# Patient Record
Sex: MALE | Race: WHITE | URBAN - METROPOLITAN AREA
[De-identification: names, ages, dates, MRNs, and addresses within clinical notes are randomized per-mention and may not be internally consistent; named-entity substitution may affect disease eponyms.]

---

## 2024-03-08 PROBLEM — Z86.79 HISTORY OF HYPERTENSION: Status: ACTIVE | Noted: 2024-03-08

## 2024-03-08 PROBLEM — F31.9 BIPOLAR 1 DISORDER (HCC): Status: ACTIVE | Noted: 2024-03-08

## 2024-03-08 PROBLEM — Z00.00 ROUTINE HEALTH MAINTENANCE: Status: ACTIVE | Noted: 2024-03-08

## 2024-05-08 PROBLEM — F31.9 BIPOLAR 1 DISORDER (HCC): Chronic | Status: ACTIVE | Noted: 2024-03-08

## 2024-05-08 PROBLEM — F41.9 ANXIETY: Status: ACTIVE | Noted: 2024-05-08

## 2024-07-08 PROBLEM — Z71.85 VACCINE COUNSELING: Chronic | Status: ACTIVE | Noted: 2024-07-08

## 2024-07-08 PROBLEM — Z71.85 VACCINE COUNSELING: Status: ACTIVE | Noted: 2024-07-08

## 2024-09-21 ENCOUNTER — APPOINTMENT (OUTPATIENT)
Dept: RADIOLOGY | Facility: MEDICAL CENTER | Age: 64
End: 2024-09-21
Attending: INTERNAL MEDICINE
Payer: MEDICARE

## 2024-09-21 ENCOUNTER — APPOINTMENT (OUTPATIENT)
Dept: RADIOLOGY | Facility: MEDICAL CENTER | Age: 64
End: 2024-09-21
Attending: EMERGENCY MEDICINE
Payer: MEDICARE

## 2024-09-21 ENCOUNTER — HOSPITAL ENCOUNTER (OUTPATIENT)
Facility: MEDICAL CENTER | Age: 64
End: 2024-09-22
Attending: EMERGENCY MEDICINE | Admitting: INTERNAL MEDICINE
Payer: MEDICARE

## 2024-09-21 ENCOUNTER — APPOINTMENT (OUTPATIENT)
Dept: RADIOLOGY | Facility: MEDICAL CENTER | Age: 64
End: 2024-09-21
Payer: MEDICARE

## 2024-09-21 DIAGNOSIS — R47.9 SPEECH DISTURBANCE, UNSPECIFIED TYPE: ICD-10-CM

## 2024-09-21 DIAGNOSIS — R41.82 ALTERED MENTAL STATUS, UNSPECIFIED ALTERED MENTAL STATUS TYPE: ICD-10-CM

## 2024-09-21 PROBLEM — R29.90 STROKE-LIKE SYMPTOMS: Status: ACTIVE | Noted: 2024-09-21

## 2024-09-21 LAB
ABO + RH BLD: NORMAL
ABO GROUP BLD: NORMAL
ALBUMIN SERPL BCP-MCNC: 4.4 G/DL (ref 3.2–4.9)
ALBUMIN/GLOB SERPL: 1.5 G/DL
ALP SERPL-CCNC: 69 U/L (ref 30–99)
ALT SERPL-CCNC: 66 U/L (ref 2–50)
AMPHET UR QL SCN: NEGATIVE
ANION GAP SERPL CALC-SCNC: 12 MMOL/L (ref 7–16)
APTT PPP: 24 SEC (ref 24.7–36)
AST SERPL-CCNC: 107 U/L (ref 12–45)
BARBITURATES UR QL SCN: NEGATIVE
BASOPHILS # BLD AUTO: 0.1 % (ref 0–1.8)
BASOPHILS # BLD: 0.01 K/UL (ref 0–0.12)
BENZODIAZ UR QL SCN: NEGATIVE
BILIRUB SERPL-MCNC: 0.8 MG/DL (ref 0.1–1.5)
BLD GP AB SCN SERPL QL: NORMAL
BUN SERPL-MCNC: 25 MG/DL (ref 8–22)
BZE UR QL SCN: NEGATIVE
CALCIUM ALBUM COR SERPL-MCNC: 8.7 MG/DL (ref 8.5–10.5)
CALCIUM SERPL-MCNC: 9 MG/DL (ref 8.5–10.5)
CANNABINOIDS UR QL SCN: POSITIVE
CHLORIDE SERPL-SCNC: 103 MMOL/L (ref 96–112)
CO2 SERPL-SCNC: 23 MMOL/L (ref 20–33)
CREAT SERPL-MCNC: 0.97 MG/DL (ref 0.5–1.4)
EOSINOPHIL # BLD AUTO: 0 K/UL (ref 0–0.51)
EOSINOPHIL NFR BLD: 0 % (ref 0–6.9)
ERYTHROCYTE [DISTWIDTH] IN BLOOD BY AUTOMATED COUNT: 46.1 FL (ref 35.9–50)
EST. AVERAGE GLUCOSE BLD GHB EST-MCNC: 111 MG/DL
ETHANOL BLD-MCNC: <10.1 MG/DL
FENTANYL UR QL: NEGATIVE
GFR SERPLBLD CREATININE-BSD FMLA CKD-EPI: 88 ML/MIN/1.73 M 2
GLOBULIN SER CALC-MCNC: 2.9 G/DL (ref 1.9–3.5)
GLUCOSE SERPL-MCNC: 114 MG/DL (ref 65–99)
HBA1C MFR BLD: 5.5 % (ref 4–5.6)
HCT VFR BLD AUTO: 43.9 % (ref 42–52)
HGB BLD-MCNC: 15.6 G/DL (ref 14–18)
IMM GRANULOCYTES # BLD AUTO: 0.06 K/UL (ref 0–0.11)
IMM GRANULOCYTES NFR BLD AUTO: 0.5 % (ref 0–0.9)
INR PPP: 0.95 (ref 0.87–1.13)
LYMPHOCYTES # BLD AUTO: 1.11 K/UL (ref 1–4.8)
LYMPHOCYTES NFR BLD: 8.5 % (ref 22–41)
MCH RBC QN AUTO: 32.4 PG (ref 27–33)
MCHC RBC AUTO-ENTMCNC: 35.5 G/DL (ref 32.3–36.5)
MCV RBC AUTO: 91.3 FL (ref 81.4–97.8)
METHADONE UR QL SCN: NEGATIVE
MONOCYTES # BLD AUTO: 0.7 K/UL (ref 0–0.85)
MONOCYTES NFR BLD AUTO: 5.3 % (ref 0–13.4)
NEUTROPHILS # BLD AUTO: 11.21 K/UL (ref 1.82–7.42)
NEUTROPHILS NFR BLD: 85.6 % (ref 44–72)
NRBC # BLD AUTO: 0 K/UL
NRBC BLD-RTO: 0 /100 WBC (ref 0–0.2)
OPIATES UR QL SCN: NEGATIVE
OXYCODONE UR QL SCN: NEGATIVE
PCP UR QL SCN: NEGATIVE
PLATELET # BLD AUTO: 262 K/UL (ref 164–446)
PMV BLD AUTO: 9.7 FL (ref 9–12.9)
POTASSIUM SERPL-SCNC: 4.5 MMOL/L (ref 3.6–5.5)
PROPOXYPH UR QL SCN: NEGATIVE
PROT SERPL-MCNC: 7.3 G/DL (ref 6–8.2)
PROTHROMBIN TIME: 12.6 SEC (ref 12–14.6)
RBC # BLD AUTO: 4.81 M/UL (ref 4.7–6.1)
RH BLD: NORMAL
SODIUM SERPL-SCNC: 138 MMOL/L (ref 135–145)
TROPONIN T SERPL-MCNC: 12 NG/L (ref 6–19)
TSH SERPL-ACNC: 1.39 UIU/ML (ref 0.35–5.5)
WBC # BLD AUTO: 13.1 K/UL (ref 4.8–10.8)

## 2024-09-21 PROCEDURE — 85610 PROTHROMBIN TIME: CPT

## 2024-09-21 PROCEDURE — G0378 HOSPITAL OBSERVATION PER HR: HCPCS

## 2024-09-21 PROCEDURE — 86900 BLOOD TYPING SEROLOGIC ABO: CPT

## 2024-09-21 PROCEDURE — 82077 ASSAY SPEC XCP UR&BREATH IA: CPT

## 2024-09-21 PROCEDURE — 70551 MRI BRAIN STEM W/O DYE: CPT

## 2024-09-21 PROCEDURE — 700111 HCHG RX REV CODE 636 W/ 250 OVERRIDE (IP)

## 2024-09-21 PROCEDURE — 85025 COMPLETE CBC W/AUTO DIFF WBC: CPT

## 2024-09-21 PROCEDURE — 99285 EMERGENCY DEPT VISIT HI MDM: CPT

## 2024-09-21 PROCEDURE — 96375 TX/PRO/DX INJ NEW DRUG ADDON: CPT

## 2024-09-21 PROCEDURE — A9270 NON-COVERED ITEM OR SERVICE: HCPCS

## 2024-09-21 PROCEDURE — 36415 COLL VENOUS BLD VENIPUNCTURE: CPT

## 2024-09-21 PROCEDURE — 700102 HCHG RX REV CODE 250 W/ 637 OVERRIDE(OP): Performed by: EMERGENCY MEDICINE

## 2024-09-21 PROCEDURE — 700102 HCHG RX REV CODE 250 W/ 637 OVERRIDE(OP)

## 2024-09-21 PROCEDURE — 83036 HEMOGLOBIN GLYCOSYLATED A1C: CPT

## 2024-09-21 PROCEDURE — 700117 HCHG RX CONTRAST REV CODE 255: Performed by: EMERGENCY MEDICINE

## 2024-09-21 PROCEDURE — 96374 THER/PROPH/DIAG INJ IV PUSH: CPT

## 2024-09-21 PROCEDURE — 84484 ASSAY OF TROPONIN QUANT: CPT

## 2024-09-21 PROCEDURE — 85730 THROMBOPLASTIN TIME PARTIAL: CPT

## 2024-09-21 PROCEDURE — 70450 CT HEAD/BRAIN W/O DYE: CPT

## 2024-09-21 PROCEDURE — 96372 THER/PROPH/DIAG INJ SC/IM: CPT

## 2024-09-21 PROCEDURE — 99223 1ST HOSP IP/OBS HIGH 75: CPT | Mod: FS

## 2024-09-21 PROCEDURE — 0042T CT-CEREBRAL PERFUSION ANALYSIS: CPT

## 2024-09-21 PROCEDURE — 80307 DRUG TEST PRSMV CHEM ANLYZR: CPT

## 2024-09-21 PROCEDURE — 84443 ASSAY THYROID STIM HORMONE: CPT

## 2024-09-21 PROCEDURE — A9270 NON-COVERED ITEM OR SERVICE: HCPCS | Performed by: EMERGENCY MEDICINE

## 2024-09-21 PROCEDURE — 700111 HCHG RX REV CODE 636 W/ 250 OVERRIDE (IP): Mod: JZ | Performed by: EMERGENCY MEDICINE

## 2024-09-21 PROCEDURE — 86850 RBC ANTIBODY SCREEN: CPT

## 2024-09-21 PROCEDURE — 74018 RADEX ABDOMEN 1 VIEW: CPT

## 2024-09-21 PROCEDURE — 86901 BLOOD TYPING SEROLOGIC RH(D): CPT

## 2024-09-21 PROCEDURE — 80053 COMPREHEN METABOLIC PANEL: CPT

## 2024-09-21 PROCEDURE — 70496 CT ANGIOGRAPHY HEAD: CPT

## 2024-09-21 PROCEDURE — 70498 CT ANGIOGRAPHY NECK: CPT

## 2024-09-21 PROCEDURE — 71045 X-RAY EXAM CHEST 1 VIEW: CPT

## 2024-09-21 RX ORDER — QUETIAPINE FUMARATE 200 MG/1
200 TABLET, FILM COATED ORAL
Status: DISCONTINUED | OUTPATIENT
Start: 2024-09-21 | End: 2024-09-22 | Stop reason: HOSPADM

## 2024-09-21 RX ORDER — ENOXAPARIN SODIUM 100 MG/ML
40 INJECTION SUBCUTANEOUS DAILY
Status: DISCONTINUED | OUTPATIENT
Start: 2024-09-21 | End: 2024-09-22 | Stop reason: HOSPADM

## 2024-09-21 RX ORDER — ASPIRIN 300 MG/1
300 SUPPOSITORY RECTAL DAILY
Status: DISCONTINUED | OUTPATIENT
Start: 2024-09-21 | End: 2024-09-22 | Stop reason: HOSPADM

## 2024-09-21 RX ORDER — ASPIRIN 81 MG/1
162 TABLET ORAL ONCE
Status: COMPLETED | OUTPATIENT
Start: 2024-09-21 | End: 2024-09-21

## 2024-09-21 RX ORDER — PAROXETINE 20 MG/1
20 TABLET, FILM COATED ORAL
Status: DISCONTINUED | OUTPATIENT
Start: 2024-09-21 | End: 2024-09-22 | Stop reason: HOSPADM

## 2024-09-21 RX ORDER — HYDRALAZINE HYDROCHLORIDE 20 MG/ML
10 INJECTION INTRAMUSCULAR; INTRAVENOUS EVERY 4 HOURS PRN
Status: DISCONTINUED | OUTPATIENT
Start: 2024-09-21 | End: 2024-09-22 | Stop reason: HOSPADM

## 2024-09-21 RX ORDER — ASPIRIN 81 MG/1
81 TABLET, CHEWABLE ORAL DAILY
Status: DISCONTINUED | OUTPATIENT
Start: 2024-09-21 | End: 2024-09-22 | Stop reason: HOSPADM

## 2024-09-21 RX ORDER — LORAZEPAM 2 MG/ML
1 INJECTION INTRAMUSCULAR
Status: COMPLETED | OUTPATIENT
Start: 2024-09-21 | End: 2024-09-21

## 2024-09-21 RX ORDER — ONDANSETRON 2 MG/ML
4 INJECTION INTRAMUSCULAR; INTRAVENOUS ONCE
Status: COMPLETED | OUTPATIENT
Start: 2024-09-21 | End: 2024-09-21

## 2024-09-21 RX ORDER — M-VIT,TX,IRON,MINS/CALC/FOLIC 27MG-0.4MG
1 TABLET ORAL DAILY
COMMUNITY

## 2024-09-21 RX ORDER — ACETAMINOPHEN 325 MG/1
650 TABLET ORAL EVERY 6 HOURS PRN
Status: DISCONTINUED | OUTPATIENT
Start: 2024-09-21 | End: 2024-09-22 | Stop reason: HOSPADM

## 2024-09-21 RX ADMIN — IOHEXOL 96 ML: 350 INJECTION, SOLUTION INTRAVENOUS at 14:45

## 2024-09-21 RX ADMIN — ONDANSETRON 4 MG: 2 INJECTION INTRAMUSCULAR; INTRAVENOUS at 15:30

## 2024-09-21 RX ADMIN — IOHEXOL 40 ML: 350 INJECTION, SOLUTION INTRAVENOUS at 14:45

## 2024-09-21 RX ADMIN — ASPIRIN 162 MG: 81 TABLET, COATED ORAL at 16:05

## 2024-09-21 RX ADMIN — ENOXAPARIN SODIUM 40 MG: 100 INJECTION SUBCUTANEOUS at 18:41

## 2024-09-21 RX ADMIN — ASPIRIN 81 MG: 81 TABLET, CHEWABLE ORAL at 18:41

## 2024-09-21 RX ADMIN — PAROXETINE HYDROCHLORIDE 20 MG: 20 TABLET, FILM COATED ORAL at 22:56

## 2024-09-21 RX ADMIN — LORAZEPAM 1 MG: 2 INJECTION INTRAMUSCULAR; INTRAVENOUS at 22:02

## 2024-09-21 RX ADMIN — QUETIAPINE FUMARATE 200 MG: 200 TABLET ORAL at 23:04

## 2024-09-21 SDOH — ECONOMIC STABILITY: TRANSPORTATION INSECURITY
IN THE PAST 12 MONTHS, HAS LACK OF RELIABLE TRANSPORTATION KEPT YOU FROM MEDICAL APPOINTMENTS, MEETINGS, WORK OR FROM GETTING THINGS NEEDED FOR DAILY LIVING?: NO

## 2024-09-21 SDOH — ECONOMIC STABILITY: TRANSPORTATION INSECURITY
IN THE PAST 12 MONTHS, HAS THE LACK OF TRANSPORTATION KEPT YOU FROM MEDICAL APPOINTMENTS OR FROM GETTING MEDICATIONS?: NO

## 2024-09-21 ASSESSMENT — PATIENT HEALTH QUESTIONNAIRE - PHQ9
SUM OF ALL RESPONSES TO PHQ9 QUESTIONS 1 AND 2: 0
1. LITTLE INTEREST OR PLEASURE IN DOING THINGS: NOT AT ALL
2. FEELING DOWN, DEPRESSED, IRRITABLE, OR HOPELESS: NOT AT ALL

## 2024-09-21 ASSESSMENT — LIFESTYLE VARIABLES
HAVE PEOPLE ANNOYED YOU BY CRITICIZING YOUR DRINKING: NO
EVER HAD A DRINK FIRST THING IN THE MORNING TO STEADY YOUR NERVES TO GET RID OF A HANGOVER: NO
ON A TYPICAL DAY WHEN YOU DRINK ALCOHOL HOW MANY DRINKS DO YOU HAVE: 1
TOTAL SCORE: 0
TOTAL SCORE: 0
HOW MANY TIMES IN THE PAST YEAR HAVE YOU HAD 5 OR MORE DRINKS IN A DAY: 0
TOTAL SCORE: 0
AVERAGE NUMBER OF DAYS PER WEEK YOU HAVE A DRINK CONTAINING ALCOHOL: 0
DOES PATIENT WANT TO STOP DRINKING: NO
CONSUMPTION TOTAL: NEGATIVE
HAVE YOU EVER FELT YOU SHOULD CUT DOWN ON YOUR DRINKING: NO
ALCOHOL_USE: NO
EVER FELT BAD OR GUILTY ABOUT YOUR DRINKING: NO

## 2024-09-21 ASSESSMENT — PAIN DESCRIPTION - PAIN TYPE
TYPE: ACUTE PAIN
TYPE: ACUTE PAIN

## 2024-09-21 ASSESSMENT — SOCIAL DETERMINANTS OF HEALTH (SDOH)
IN THE PAST 12 MONTHS, HAS THE ELECTRIC, GAS, OIL, OR WATER COMPANY THREATENED TO SHUT OFF SERVICE IN YOUR HOME?: NO
WITHIN THE LAST YEAR, HAVE YOU BEEN HUMILIATED OR EMOTIONALLY ABUSED IN OTHER WAYS BY YOUR PARTNER OR EX-PARTNER?: NO
WITHIN THE LAST YEAR, HAVE TO BEEN RAPED OR FORCED TO HAVE ANY KIND OF SEXUAL ACTIVITY BY YOUR PARTNER OR EX-PARTNER?: NO
WITHIN THE PAST 12 MONTHS, YOU WORRIED THAT YOUR FOOD WOULD RUN OUT BEFORE YOU GOT THE MONEY TO BUY MORE: NEVER TRUE
WITHIN THE PAST 12 MONTHS, THE FOOD YOU BOUGHT JUST DIDN'T LAST AND YOU DIDN'T HAVE MONEY TO GET MORE: NEVER TRUE
WITHIN THE LAST YEAR, HAVE YOU BEEN AFRAID OF YOUR PARTNER OR EX-PARTNER?: NO
WITHIN THE LAST YEAR, HAVE YOU BEEN KICKED, HIT, SLAPPED, OR OTHERWISE PHYSICALLY HURT BY YOUR PARTNER OR EX-PARTNER?: NO

## 2024-09-21 ASSESSMENT — ENCOUNTER SYMPTOMS
FEVER: 0
HEADACHES: 0
PALPITATIONS: 0
SHORTNESS OF BREATH: 0
SPEECH CHANGE: 1
VOMITING: 0
CHILLS: 0
DIARRHEA: 0
NAUSEA: 0
COUGH: 0
DIZZINESS: 0
ABDOMINAL PAIN: 0
HEARTBURN: 0

## 2024-09-21 ASSESSMENT — FIBROSIS 4 INDEX: FIB4 SCORE: 3.07

## 2024-09-21 NOTE — H&P
Hospital Medicine History & Physical Note    Date of Service  9/21/2024    Primary Care Physician  NO Browne    Consultants  neurology    Specialist Names: Dr Carrillo    Code Status  Full Code    Chief Complaint  Chief Complaint   Patient presents with    ALOC     Pt was found in lobby of his apartment complex confused, with slurred speech. En-route for EMS pt was A &O x 2, GCS 14, with difficulty speaking. On hospital arrival pt became aphasic.        History of Presenting Illness  David Bowling is a 63 y.o. male who presented 9/21/2024 with altered mental status and difficulty speaking for thirty minutes prior to arrival in the lobby of his apartment complex. The apartment  then called EMS. EMS reports that there is no last known well. They noted that the patient was A&O x0 with a GCS of 9. Patient was unsure when the difficulty speaking started. He endorsed a headache. EMS did not note any signs of trauma. They reported that the patient had a change in mentation when pulling into the ED with a GCS of 14 and A&Ox2. However, the patient's mentation status changed again once inside the ED with EMS noting a waxing and waning trend in mentation. EMS reported that his blood sugar was 175 and his blood pressure was 130/88.     In the ED, he is afebrile, vitals are stable and he is on room air. Labs reveal leukocytosis with a left shift and slightly elevated LFT's.  Troponin is negative.  Diagnostic alcohol is negative. Stroke alert was called upon his arrival to ED. Head CT without evidence of hemorrhage, large territory stroke, high grade stenoses, medium or large vessel occlusion. CT perfusion was a normal study. Neurology has low suspicion for a stroke and did not feel he was a candidate for thrombolytics.  Patient will be admitted for stroke work up.    I discussed the plan of care with patient and ERP .    Review of Systems  Review of Systems   Constitutional:  Negative for chills, fever and  malaise/fatigue.   Respiratory:  Negative for cough and shortness of breath.    Cardiovascular:  Negative for chest pain, palpitations and leg swelling.   Gastrointestinal:  Negative for abdominal pain, diarrhea, heartburn, nausea and vomiting.   Genitourinary:  Negative for dysuria, frequency and urgency.   Neurological:  Positive for speech change. Negative for dizziness and headaches.   All other systems reviewed and are negative.      Past Medical History   has a past medical history of Bipolar 1 disorder (HCC) (03/08/2024), Hyperlipidemia (10/19/2015), Lumbar disc herniation (10/19/2015), Melanoma of neck (HCC), Moderate episode of recurrent major depressive disorder (HCC) (10/19/2015), SLAP lesion of shoulder, and Spinal stenosis at L4-L5 level (10/19/2015).    Surgical History   has a past surgical history that includes shoulder surgery (Left); wide excision melanoma, leg, w/poss.stsg; and orif, forearm (Right).     Family History  family history is not on file.   Family history reviewed with patient. There is no family history that is pertinent to the chief complaint.     Social History   reports that he has been smoking cigarettes. He has never used smokeless tobacco. He reports current alcohol use of about 8.4 oz of alcohol per week. He reports current drug use. Drug: Marijuana.    Allergies  No Known Allergies    Medications  Prior to Admission Medications   Prescriptions Last Dose Informant Patient Reported? Taking?   Clonazepam 0.25 MG TABLET DISPERSIBLE 9/21/2024 at 1000 Patient, Patient's Home Pharmacy No Yes   Sig: Take 1 Tablet by mouth 2 times a day as needed (ANXIETY) for up to 90 days.   PARoxetine (PAXIL) 20 MG Tab 9/20/2024 at 2230 Patient, Patient's Home Pharmacy No Yes   Sig: Take 1 Tablet by mouth at bedtime.   quetiapine (SEROQUEL XR) 200 MG XR tablet 9/20/2024 at 2230 Patient, Patient's Home Pharmacy No Yes   Sig: Take 1 Tablet by mouth at bedtime.   therapeutic multivitamin-minerals  (ESTELA-M) Tab 9/21/2024 at 1000 Patient, Patient's Home Pharmacy Yes Yes   Sig: Take 1 Tablet by mouth every day.      Facility-Administered Medications: None       Physical Exam  Temp:  [36.5 °C (97.7 °F)-37.2 °C (98.9 °F)] 37.2 °C (98.9 °F)  Pulse:  [66-85] 67  Resp:  [15-20] 18  BP: (125-149)/() 145/83  SpO2:  [95 %-98 %] 95 %  Blood Pressure: 125/69   Temperature: 36.5 °C (97.7 °F)   Pulse: 75   Respiration: 15   Pulse Oximetry: 95 %       Physical Exam  Vitals and nursing note reviewed.   Constitutional:       Appearance: Normal appearance. He is not ill-appearing.   HENT:      Head: Normocephalic and atraumatic.      Jaw: There is normal jaw occlusion.      Right Ear: Hearing normal.      Left Ear: Hearing normal.      Nose: Nose normal.      Mouth/Throat:      Lips: Pink.      Mouth: Mucous membranes are moist.   Eyes:      Extraocular Movements: Extraocular movements intact.      Conjunctiva/sclera: Conjunctivae normal.      Pupils: Pupils are equal, round, and reactive to light.   Neck:      Vascular: No carotid bruit.   Cardiovascular:      Rate and Rhythm: Normal rate and regular rhythm.      Pulses: Normal pulses.      Heart sounds: Normal heart sounds, S1 normal and S2 normal.   Pulmonary:      Effort: Pulmonary effort is normal.      Breath sounds: Normal breath sounds and air entry. No stridor.   Musculoskeletal:      Cervical back: Normal range of motion and neck supple.      Right lower leg: No edema.      Left lower leg: No edema.   Skin:     General: Skin is warm and dry.      Capillary Refill: Capillary refill takes less than 2 seconds.   Neurological:      General: No focal deficit present.      Mental Status: He is alert and oriented to person, place, and time. Mental status is at baseline.      Sensory: Sensation is intact.      Motor: Motor function is intact.   Psychiatric:         Attention and Perception: Attention and perception normal.         Mood and Affect: Mood and affect  "normal.         Speech: Speech normal.         Behavior: Behavior normal. Behavior is cooperative.         Laboratory:  Recent Labs     09/21/24  1337   WBC 13.1*   RBC 4.81   HEMOGLOBIN 15.6   HEMATOCRIT 43.9   MCV 91.3   MCH 32.4   MCHC 35.5   RDW 46.1   PLATELETCT 262   MPV 9.7     Recent Labs     09/21/24  1447   SODIUM 138   POTASSIUM 4.5   CHLORIDE 103   CO2 23   GLUCOSE 114*   BUN 25*   CREATININE 0.97   CALCIUM 9.0     Recent Labs     09/21/24  1447   ALTSGPT 66*   ASTSGOT 107*   ALKPHOSPHAT 69   TBILIRUBIN 0.8   GLUCOSE 114*     Recent Labs     09/21/24  1337   APTT 24.0*   INR 0.95     No results for input(s): \"NTPROBNP\" in the last 72 hours.      Recent Labs     09/21/24  1447   TROPONINT 12       Imaging:  [id number] VIEW   Final Result      No contraindication to MRI      CT-CTA NECK WITH & W/O-POST PROCESSING   Final Result      1.  No evidence of carotid or vertebral arterial occlusion or dissection.      2.  Mild atherosclerotic plaque involving the carotid bifurcations bilaterally      CT-CTA HEAD WITH & W/O-POST PROCESS   Final Result      1.  CT angiogram of the [place] within normal limits.      2.  Anatomic variant carotid origin of left posterior cerebral artery.      DX-CHEST-PORTABLE (1 VIEW)   Final Result      No acute cardiopulmonary abnormality identified.      CT-CEREBRAL PERFUSION ANALYSIS   Final Result      1. Cerebral blood flow less than 30% possibly representing completed infarct = 0 mL. Based on distribution of this finding, this is unlikely to represent artifact.      2. T Max more than 6 seconds possibly representing combination of completed infarct and ischemia = 0 mL. Based on the distribution of this finding, this is unlikely to represent artifact.      3. Mismatched volume possibly representing ischemic brain/penumbra= 0 mL      4.  Please note that this cerebral perfusion study and report is Quantitative and targets supratentorial (cerebral) perfusion for " "evaluation of large vessel territory acute ischemia/infarction. For example, lacunar infarcts, and brainstem/posterior fossa    ischemia/infarction are not evaluated on this study.  Data acquisition is subject to artifacts which can yield non-anatomically plausible perfusion maps which may be due to motion, bolus timing, signal to noise ratio, or other technical factors.    Perfusion map abnormalities which show non-anatomic distributions are likely artifact.   This study is not \"stand-alone\" and should only be utilized for diagnosis, management/treatment in correlation with CT, CTA, and/or MRI and clinical factors.         CT-HEAD W/O   Final Result      No evidence of acute intracranial process.               MR-BRAIN-W/O    (Results Pending)   EC-ECHOCARDIOGRAM COMPLETE W/O CONT    (Results Pending)       X-Ray:  I have personally reviewed the images and compared with prior images.    Assessment/Plan:  Justification for Admission Status  I anticipate this patient is appropriate for observation status at this time because stroke rule out    Patient will need a Telemetry bed on EMERGENCY service .  The need is secondary to stroke rule out.    * Stroke-like symptoms- (present on admission)  Assessment & Plan  - Patient presents with altered mental status and difficulty speaking  - Code stroke was called in the ER and neurology evaluated  - Neurology felt symptoms were mostly psychosomatic and felt low suspicion for stroke.  However ERP still concerned for stroke and patient is insistent regarding his symptoms.  - CTA head/neck negative  - Telemetry monitoring  - Every 4 neurochecks  - TSH  - Hemoglobin A1c  - ASA  - Lipid panel in a.m  - ECHO (no previous echo on file)  - SLP/PT/OT  - MRI brain  -Reconsult neurology if any new information    History of hypertension- (present on admission)  Assessment & Plan  - Not currently on any antihypertensives  - IV hydralazine available as needed  - Vitals every 4 " hours    Anxiety- (present on admission)  Assessment & Plan  - Continue home Paxil    Bipolar 1 disorder (HCC)- (present on admission)  Assessment & Plan  - Continue home Seroquel    Hyperlipidemia- (present on admission)  Assessment & Plan  - Lipid panel in a.m.    Moderate episode of recurrent major depressive disorder (HCC)- (present on admission)  Assessment & Plan  - Continue home Paxil and Seroquel        VTE prophylaxis: SCDs/TEDs and enoxaparin ppx

## 2024-09-21 NOTE — ED NOTES
Noted having twitching on both arms, then eyes looking straight, sided to side and downwards then started looking at RN then repetitive motion. Awake and alert, asked if he is having some, stared at RN then said yes I'm having pain but did specify. Episode lasted for 10 minutes. Erp made aware. Placed in bed alarm by ED tech.

## 2024-09-21 NOTE — ED PROVIDER NOTES
ER Provider Note    Scribed for Flower King M.d. by Slywia Blackman. 9/21/2024  1:44 PM    Primary Care Provider: NO Browne    CHIEF COMPLAINT   Chief Complaint   Patient presents with    ALOC     Pt was found in lobby of his apartment complex confused, with slurred speech. En-route for EMS pt was A &O x 2, GCS 14, with difficulty speaking. On hospital arrival pt became aphasic.      EXTERNAL RECORDS REVIEWED  Hospital records reviewed showed that the [facility]. He just saw the APRN in July. The patient has a history of bipolar disorder, anxiety and depression.     HPI/ROS  LIMITATION TO HISTORY   Select: Dysarthria  OUTSIDE HISTORIAN(S):  EMS at Charge Desk who provided history as seen below.     David Bowling is a 63 y.o. male who presents to the ED via EMS complaining as a Stroke Assessment. Patient was found to have altered levels of consciousness with difficulty speaking for thirty minutes prior to arrival in the lobby of his apartment complex. The apartment  then called EMS. EMS reports that there is no last known well. They noted that the patient was A&O x0 with a GCS of 9. Patient was unsure when the difficulty speaking started. He endorsed a headache. EMS did not note any signs of trauma. They reported that the patient had a change in mentation when pulling into the ED with a GCS of 14 and A&Ox2. However, the patient's mentation status changed again once inside the ED with EMS noting a waxing and waning trend in mentation. EMS reported that his blood sugar was 175 and his blood pressure was 130/88.     PAST MEDICAL HISTORY  Past Medical History:   Diagnosis Date    Bipolar 1 disorder (HCC) 03/08/2024    Hyperlipidemia 10/19/2015    Lumbar disc herniation 10/19/2015    Melanoma of neck (HCC)     Moderate episode of recurrent major depressive disorder (HCC) 10/19/2015    SLAP lesion of shoulder     right    Spinal stenosis at L4-L5 level 10/19/2015  "      SURGICAL HISTORY  Past Surgical History:   Procedure Laterality Date    ORIF, FOREARM Right     SHOULDER SURGERY Left     WIDE EXCISION MELANOMA, LEG, W/POSS.STSG      neck       FAMILY HISTORY  No family history noted.    SOCIAL HISTORY   reports that he has been smoking cigarettes. He has never used smokeless tobacco. He reports current alcohol use of about 8.4 oz of alcohol per week. He reports current drug use. Drug: Marijuana.    CURRENT MEDICATIONS  Previous Medications    CLONAZEPAM 0.25 MG TABLET DISPERSIBLE    Take 1 Tablet by mouth 2 times a day as needed (ANXIETY) for up to 90 days.    MULTIPLE VITAMINS-MINERALS (CENTRUM ADULTS PO)    Take 1 Tablet by mouth every day at 6 PM.    PAROXETINE (PAXIL) 20 MG TAB    Take 1 Tablet by mouth at bedtime.    QUETIAPINE (SEROQUEL XR) 200 MG XR TABLET    Take 1 Tablet by mouth at bedtime.       ALLERGIES  Patient has no known allergies.    PHYSICAL EXAM  BP (!) 149/103   Pulse 85   Temp 36.5 °C (97.7 °F) (Temporal)   Resp 18   Ht 1.791 m (5' 10.51\")   Wt 82.6 kg (182 lb 1.6 oz)   SpO2 98%   BMI 25.75 kg/m²   Constitutional: Well developed, well nourished; No acute distress; Non-toxic appearance, Appears fearful and confused,   HENT: Normocephalic, atraumatic with no signs of trauma; Bilateral external ears normal;   Eyes: PERRL, EOMI, Conjunctiva normal. No discharge.   Neck:  Supple, nontender midline; No stridor; No nuchal rigidity, No signs of trauma  Lymphatic: No cervical lymphadenopathy noted.   Cardiovascular: Regular rate and rhythm without murmurs, rubs, or gallop.   Thorax & Lungs: No respiratory distress, breath sounds clear to auscultation bilaterally without wheezing, rales or rhonchi. Shallow breathing, Nontender chest wall. No crepitus or subcutaneous air  Abdomen: Soft, nontender, bowel sounds normal. No obvious masses; No pulsatile masses; no rebound, guarding, or peritoneal signs.   Skin: Good color; warm and dry without rash or " petechia.  Back: Nontender, No CVA tenderness.   Extremities: Distal radial, dorsalis pedis, posterior tibial pulses are equal bilaterally; No edema; Nontender calves or saphenous, No cyanosis, No clubbing.   Musculoskeletal: Good range of motion in all major joints. No tenderness to palpation or major deformities noted.   Neurologic: Awake and alert.  No facial droop.  Upon arrival to the ER patient was evaluated at the charge desk.  He states his name.  Speech is slurred.  Suddenly patient stopped speaking.  He looks fearful and anxious.  He looks confused.  He appears to not understand what I am saying.  He would not follow commands.  He will not elevate bilateral arms or legs off the bed on command.  He will not  my fingers.  Unclear if he has normal sensation to light touch.  He will not follow finger with eyes.  EMS, however, reported that and route patient was following commands and did not have any focal deficits on examination.  They reported no focal weakness and normal sensation to light touch.  NIH is 6    DIAGNOSTIC STUDIES    EKG/LABS  Results for orders placed or performed during the hospital encounter of 09/21/24   CBC WITH DIFFERENTIAL   Result Value Ref Range    WBC 13.1 (H) 4.8 - 10.8 K/uL    RBC 4.81 4.70 - 6.10 M/uL    Hemoglobin 15.6 14.0 - 18.0 g/dL    Hematocrit 43.9 42.0 - 52.0 %    MCV 91.3 81.4 - 97.8 fL    MCH 32.4 27.0 - 33.0 pg    MCHC 35.5 32.3 - 36.5 g/dL    RDW 46.1 35.9 - 50.0 fL    Platelet Count 262 164 - 446 K/uL    MPV 9.7 9.0 - 12.9 fL    Neutrophils-Polys 85.60 (H) 44.00 - 72.00 %    Lymphocytes 8.50 (L) 22.00 - 41.00 %    Monocytes 5.30 0.00 - 13.40 %    Eosinophils 0.00 0.00 - 6.90 %    Basophils 0.10 0.00 - 1.80 %    Immature Granulocytes 0.50 0.00 - 0.90 %    Nucleated RBC 0.00 0.00 - 0.20 /100 WBC    Neutrophils (Absolute) 11.21 (H) 1.82 - 7.42 K/uL    Lymphs (Absolute) 1.11 1.00 - 4.80 K/uL    Monos (Absolute) 0.70 0.00 - 0.85 K/uL    Eos (Absolute) 0.00 0.00 - 0.51  K/uL    Baso (Absolute) 0.01 0.00 - 0.12 K/uL    Immature Granulocytes (abs) 0.06 0.00 - 0.11 K/uL    NRBC (Absolute) 0.00 K/uL   PROTHROMBIN TIME   Result Value Ref Range    PT 12.6 12.0 - 14.6 sec    INR 0.95 0.87 - 1.13   APTT   Result Value Ref Range    APTT 24.0 (L) 24.7 - 36.0 sec   URINE DRUG SCREEN   Result Value Ref Range    Amphetamines Urine Negative Negative    Barbiturates Negative Negative    Benzodiazepines Negative Negative    Cocaine Metabolite Negative Negative    Fentanyl, Urine Negative Negative    Methadone Negative Negative    Opiates Negative Negative    Oxycodone Negative Negative    Phencyclidine -Pcp Negative Negative    Propoxyphene Negative Negative    Cannabinoid Metab Positive (A) Negative   Comp Metabolic Panel   Result Value Ref Range    Sodium 138 135 - 145 mmol/L    Potassium 4.5 3.6 - 5.5 mmol/L    Chloride 103 96 - 112 mmol/L    Co2 23 20 - 33 mmol/L    Anion Gap 12.0 7.0 - 16.0    Glucose 114 (H) 65 - 99 mg/dL    Bun 25 (H) 8 - 22 mg/dL    Creatinine 0.97 0.50 - 1.40 mg/dL    Calcium 9.0 8.5 - 10.5 mg/dL    Correct Calcium 8.7 8.5 - 10.5 mg/dL    AST(SGOT) 107 (H) 12 - 45 U/L    ALT(SGPT) 66 (H) 2 - 50 U/L    Alkaline Phosphatase 69 30 - 99 U/L    Total Bilirubin 0.8 0.1 - 1.5 mg/dL    Albumin 4.4 3.2 - 4.9 g/dL    Total Protein 7.3 6.0 - 8.2 g/dL    Globulin 2.9 1.9 - 3.5 g/dL    A-G Ratio 1.5 g/dL   TROPONIN   Result Value Ref Range    Troponin T 12 6 - 19 ng/L   DIAGNOSTIC ALCOHOL   Result Value Ref Range    Diagnostic Alcohol <10.1 <10.1 mg/dL   ESTIMATED GFR   Result Value Ref Range    GFR (CKD-EPI) 88 >60 mL/min/1.73 m 2      I have independently interpreted this EKG    RADIOLOGY/PROCEDURES   The attending emergency physician has independently interpreted the diagnostic imaging associated with this visit and am waiting the final reading from the radiologist.     My preliminary interpretation is a follows: ER MD is reviewed the patient's CT brain and the CT scanner.  No  "obvious head bleed.    Radiologist interpretation:  CT-CTA NECK WITH & W/O-POST PROCESSING   Final Result      1.  No evidence of carotid or vertebral arterial occlusion or dissection.      2.  Mild atherosclerotic plaque involving the carotid bifurcations bilaterally      CT-CTA HEAD WITH & W/O-POST PROCESS   Final Result      1.  CT angiogram of the [place] within normal limits.      2.  Anatomic variant carotid origin of left posterior cerebral artery.      DX-CHEST-PORTABLE (1 VIEW)   Final Result      No acute cardiopulmonary abnormality identified.      CT-CEREBRAL PERFUSION ANALYSIS   Final Result      1. Cerebral blood flow less than 30% possibly representing completed infarct = 0 mL. Based on distribution of this finding, this is unlikely to represent artifact.      2. T Max more than 6 seconds possibly representing combination of completed infarct and ischemia = 0 mL. Based on the distribution of this finding, this is unlikely to represent artifact.      3. Mismatched volume possibly representing ischemic brain/penumbra= 0 mL      4.  Please note that this cerebral perfusion study and report is Quantitative and targets supratentorial (cerebral) perfusion for evaluation of large vessel territory acute ischemia/infarction. For example, lacunar infarcts, and brainstem/posterior fossa    ischemia/infarction are not evaluated on this study.  Data acquisition is subject to artifacts which can yield non-anatomically plausible perfusion maps which may be due to motion, bolus timing, signal to noise ratio, or other technical factors.    Perfusion map abnormalities which show non-anatomic distributions are likely artifact.   This study is not \"stand-alone\" and should only be utilized for diagnosis, management/treatment in correlation with CT, CTA, and/or MRI and clinical factors.         CT-HEAD W/O   Final Result      No evidence of acute intracranial process.               EC-ECHOCARDIOGRAM COMPLETE W/O " CONT    (Results Pending)   MR-BRAIN-W/O    (Results Pending)   [id number] VIEW    (Results Pending)       COURSE & MEDICAL DECISION MAKING     ASSESSMENT, COURSE AND PLAN  Care Narrative: Patient presents to the ER with difficulty speaking.  EMS said onset was unknown however patient was in the office his apartment complex when EMS arrived.  Staff reported the patient was having slurred speech.  EMS reports that patient seemed confused and was unable to speak when they arrived.  Once the patient got in the ambulance he started speaking.  He also seemed much less confused.  Upon arrival here to the charge desk the patient spoke his name.  It was intelligible although it was slurred.  He immediately then stopped talking, became confused, looked fearful and anxious, would not follow commands, and appeared to be aphasic.  He did not seem to understand what I was asking of him in terms of questions or commands.  EMS reports that while patient was in the ambulance and speaking normally and less confused they did do a neurologic examination they did not find any focal weakness.  Patient was immediately taken to the CT scanner.  Given patient's staccato like stroke symptoms, I contacted the neurologist, Dr. Carrillo.  He kindly admit the patient in the CT scanner.  After evaluating the patient he does not feel the patient is a thrombolytic candidate.  CT/CTA of the head and neck were normal.  After the patient got taken off the CT table, he started speaking normally again.  Upon reevaluation here in the ER the patient was speaking normally.  He has history of bipolar disorder and schizophrenia.  He stopped taking his doxepin and Klonopin 2 weeks ago.  He is only on Seroquel right now.  He is tangential in his communication.  He has pressured speech.  He has some flight of ideas.  Patient, however, was very clear that the speech disturbance was very real for him and concerned him quite a bit.  He said that he knew what he  wanted to say but he just could not get the words out.  He distinctly remembers the moment when she was unable to talk.  He tells me that he believes the symptoms started about a half an hour prior to EMS arrival.  At this time patient is feeling much better.  He says he is back to normal.  It is unclear whether this is a supratentorial problem or perhaps patient may have had a TIA.  Given patient's 2 episodes of speech disturbance associated with confusion and the fact the patient is fairly clear that he recalls exactly when he lost his ability to speak and that he knew what he wanted to say but the words would not come out, I think it best that we admit the patient to the hospital for further observation and possible further workup.  I spoke with the hospitalist JOSEFINA on-call and she will kindly evaluate the patient hospitalization.    1:32 PM - Patient seen and examined at Charge Desk as a Stroke Assessment. Discussed plan of care, including a diagnostic work up with imaging to evaluate for a stroke. Patient was unable to consent to the plan of care due to the nature of his condition. Stroke Neurology was paged.     1:46 PM - Dr. Carrillo (Neurology) present at CT scanner. I discussed the patient's case and the above findings with Dr. Carrillo (Neurology) who does not think that the patient is a candidate for thrombolytics.      1:50 PM - The patient is talking normally as he is moved off of the table from the CT scanner.     4:20 PM - Patient was reevaluated at bedside. Discussed lab and radiology results with the patient. The patient is speaking normally. He remembers not being able to talk and was scared and frustrated that he was unable to. Patient remembers that he was riding his bike earlier this morning and wondered if he got too hot. Patient is taking Seroquel. Two weeks ago, he was able Klonopin and doxepin but that he has not been on those medication in two weeks. The patient is tangential in his conversation with  pressured speech and a flight of ideas. His neurological exam was completley normal. The patient remembers doing some strange twitching of his arms but does not know what that was about. This arm twitching occurred both when he was having speech issues and without. Denies being sick or ill lately. Patient denies these symptoms happening previously.  ER MD repeated the patient's neurologic examination.  Patient is awake and alert.  His speech is clear.  No facial asymmetry.  Cranial nerves II through XII are intact.  No drift of upper or lower extremities.  No ataxia with finger-to-nose.   are 5 out of 5 and equal bilaterally.  Lower extremity strength are 5 out of 5 and equal testing of dorsiflexors and plantar flexors.  Sensation is intact to light touch.    5:21 PM - I discussed the patient's case and the above findings with Dr. Reich (Hospitalist) who agrees to evaluate the patient for hospitalization.     ADDITIONAL PROBLEM LIST  Problem #1: Difficulty speaking    DISPOSITION AND DISCUSSIONS  I have discussed management of the patient with the following physicians and CAT's:  Dr. Carrillo (Neurology), Rasta Crystal(Hospitalist APRN)    Discussion of management with other [facility] or appropriate source(s): None     Escalation of care considered, and ultimately not performed: diagnostic imaging.  Patient will be admitted to the hospital for transient speech disturbance.  MRI scan may be helpful to definitively exclude CVA     Barriers to care at this time, including but not limited to:  None .     Decision tools and prescription drugs considered including, but not limited to: After evaluation by Dr. Carrillo, neurologist, patient is not a thrombolytic candidate.    DISPOSITION:  Patient will be hospitalized by dash Wells in guarded condition.     FINAL DIANGOSIS  1. Altered mental status, unspecified altered mental status type Acute   2. Speech disturbance, unspecified type Acute      This dictation has  been created using voice recognition software. The accuracy of the dictation is limited by the abilities of the software. I expect there may be some errors of grammar and possibly content. I made every attempt to manually correct the errors within my dictation. However, errors related to voice recognition software may still exist and should be interpreted within the appropriate context.      Sylwia STEPHENS ([place]), am scribing for, and in the presence of, Flower King M.D..    Electronically signed by: Sylwia Blackman ([place]), 9/21/2024    Flower STEPHENS M.D. personally performed the services described in this documentation, as scribed by Sylwia Blackman in my presence, and it is both accurate and complete.     The note accurately reflects work and decisions made by me.  Flower King M.D.  9/21/2024  9:51 PM

## 2024-09-21 NOTE — ED NOTES
Med rec updated and complete.  Allergies reviewed.     Pt confirmed name and date of birth.      Pt denies outpatient antibiotic use in last 30 days.    No anticoagulant or antiplatelet medications.        Preferred Pharmacy   [facility] = [phone]

## 2024-09-21 NOTE — ED TRIAGE NOTES
Chief Complaint   Patient presents with    ALOC     Pt was found in lobby of his apartment complex confused, with slurred speech. En-route for EMS pt was A &O x 2, GCS 14, with difficulty speaking. On hospital arrival pt became aphasic.      BIB EMS. VS: 139/90, HR 70, Resp 16, SPO2 97% RA, GCS 9 then 14, . 20 GA left FA.     Upon hospital arrival pt following some commands. Difficulty speaking. Pt pink, warm, clammy. Pt to CT. Pt alert, oriented following all commands, + CSM all extremities immediately following CT. Pt reports he was vomiting last night and this am. He remembers someone talking to him in lobby, and being unable to respond. Complaining of nausea.

## 2024-09-21 NOTE — CONSULTS
Neurology STROKE CODE H&P  Neurohospitalist Service, [facility]    Referring Physician: Flower King M.D.    STROKE CODE:   Chief Complaint   Patient presents with    ALOC     Pt was found in lobby of his apartment complex confused, with slurred speech. En-route for EMS pt was A &O x 2, GCS 14, with difficulty speaking. On hospital arrival pt became aphasic.        To obtain the most accurate data regarding the time called, and time patient seen, refer to the stroke run-sheet and chart.  For time of CT, refer to the radiology report. See A&P below for TPA Decision and door to needle time if and when applicable.    HPI: David Bowling is a 63 year old man with no significant vascular risk factors, presenting with confusion and speech changes.   David reports that he woke up normal today, and went downstairs of his apartment to meet with the  earlier this morning.  When the manager opened the door, he was unable to talk.  He denies any associated weakness, headache.  Per EMS, the manager noted he appeared confused, so called EMS.  FSBS in 175, SBPs in 130s.  En route to ER, he begin talking normally, but on arrival to ER, he was noted to have slurred speech, speech paucity and confusion.  After CT study, he again returned to his normal talking.  He reports that he is under a lot of stress and his mom recently .  On ROS, he had an episode of nausea, headache, vomiting yesterday morning.    Review of systems: In addition to what is detailed in the HPI above, all other systems reviewed and are negative.    Past Medical History:    has a past medical history of Bipolar 1 disorder (HCC) (2024), Hyperlipidemia (10/19/2015), Lumbar disc herniation (10/19/2015), Melanoma of neck (HCC), Moderate episode of recurrent major depressive disorder (HCC) (10/19/2015), SLAP lesion of shoulder, and Spinal stenosis at L4-L5 level (10/19/2015).    FHx:  No family history of early strokes  "or blood clotting disorder.    SHx:   reports that he has been smoking cigarettes. He has never used smokeless tobacco. He reports current alcohol use of about 8.4 oz of alcohol per week. He reports current drug use. Drug: Marijuana.    Allergies:  No Known Allergies    Medications:  No current facility-administered medications for this encounter.    Current Outpatient Medications:     PARoxetine (PAXIL) 20 MG Tab, Take 1 Tablet by mouth at bedtime., Disp: 30 Tablet, Rfl: 5    quetiapine (SEROQUEL XR) 200 MG XR tablet, Take 1 Tablet by mouth at bedtime., Disp: 90 Tablet, Rfl: 3    Clonazepam 0.25 MG TABLET DISPERSIBLE, Take 1 Tablet by mouth 2 times a day as needed (ANXIETY) for up to 90 days., Disp: 60 Tablet, Rfl: 2    Multiple Vitamins-Minerals (CENTRUM ADULTS PO), Take 1 Tablet by mouth every day at 6 PM., Disp: , Rfl:     Physical Examination:    Vitals:    09/21/24 1354 09/21/24 1401   BP:  (!) 149/103   Pulse:  85   Resp:  18   Temp:  36.5 °C (97.7 °F)   TempSrc:  Temporal   SpO2:  98%   Weight: 82.6 kg (182 lb 1.6 oz)    Height: 1.791 m (5' 10.51\")          General: Patient is awake and in no acute distress  Eye: Examination of optic disks not indicated at this time given acuity of consult  Neck: There is normal range of motion  CV: Regular rate   Extremities:  Clear, dry, intact, without peripheral edema    NEUROLOGICAL EXAM:     Mental status: Awake, alert and fully oriented  Speech and language: Speech is clear and fluent.  Slightly pressured speech, tangential but redirectable.   Cranial nerve exam:  Visual fields are full. There is no nystagmus. Extraocular muscles are intact. Face is symmetric.   Motor exam: There is sustained antigravity with no downward drift in bilateral arms and legs.    Sensory exam:  Reacts to tactile in all 4 distal extremities, there is no neglect to double stim.  Coordination: No ataxia on bilateral finger-to-nose testing.  Gait: Deferred due to patient preference.    NIHSS: " "National Institutes of Health Stroke Scale    [0] 1a:Level of Consciousness    0-alert 1-drowsy   2-stupor   3-coma  [0] 1b:LOC Questions                  0-both  1-one      2-neither  [0] 1c:LOC Commands                   0-both  1-one      2-neither  [0] 2: Best Gaze                     0-nl    1-partial  2-forced  [0] 3: Visual Fields                   0-nl    1-partial  2-complete 3-bilat  [0] 4: Facial Paresis                0-nl    1-minor    2-partial  3-full  MOTOR                       0-nl  [0] 5: Right Arm           1-drift  [0] 6: Left Arm             2-some effort vs gravity  [0] 7: Right Leg           3-no effort vs gravity  [0] 8: Left Leg             4-no movement                             x-untestable  [0] 9: Limb Ataxia                    0-abs   1-1_limb   2-2+_limbs       x-untestable  [0] 10:Sensory                        0-nl    1-partial  2-dense  [0] 11:Best Language/Aphasia         0-nl    1-mild/mod 2-severe   3-mute  [0] 12:Dysarthria                     0-nl    1-mild/mod 2-severe       x-untestable  [0] 13:Neglect/Inattention            0-none  1-partial  2-complete  [0] TOTAL    Baseline Modified Cristy Scale (MRS): 1 = No significant disability, despite symptoms; able to perform all usual duties and activities    Objective Data:    Labs:  No results found for: \"PROTHROMBTM\", \"INR\"   Lab Results   Component Value Date/Time    WBC 13.1 (H) 09/21/2024 01:37 PM    RBC 4.81 09/21/2024 01:37 PM    HEMOGLOBIN 15.6 09/21/2024 01:37 PM    HEMATOCRIT 43.9 09/21/2024 01:37 PM    MCV 91.3 09/21/2024 01:37 PM    MCH 32.4 09/21/2024 01:37 PM    MCHC 35.5 09/21/2024 01:37 PM    MPV 9.7 09/21/2024 01:37 PM    NEUTSPOLYS 85.60 (H) 09/21/2024 01:37 PM    LYMPHOCYTES 8.50 (L) 09/21/2024 01:37 PM    MONOCYTES 5.30 09/21/2024 01:37 PM    EOSINOPHILS 0.00 09/21/2024 01:37 PM    BASOPHILS 0.10 09/21/2024 01:37 PM      No results found for: \"SODIUM\", \"POTASSIUM\", \"CHLORIDE\", \"CO2\", \"GLUCOSE\", \"BUN\", " "\"CREATININE\", \"BUNCREATRAT\", \"GLOMRATE\"   No results found for: \"CHOLSTRLTOT\", \"LDL\", \"HDL\", \"TRIGLYCERIDE\"    No results found for: \"ALKPHOSPHAT\", \"ASTSGOT\", \"ALTSGPT\", \"TBILIRUBIN\"     Imaging/Testing:    I interpreted and/or reviewed the patient's neuroimaging    CT-HEAD W/O   Final Result      No evidence of acute intracranial process.               DX-CHEST-PORTABLE (1 VIEW)    (Results Pending)   CT-CEREBRAL PERFUSION ANALYSIS    (Results Pending)   CT-CTA HEAD WITH & W/O-POST PROCESS    (Results Pending)   CT-CTA NECK WITH & W/O-POST PROCESSING    (Results Pending)       Assessment and Plan:  David Bowling is a 63 year old man with intermittent speech arrest, dysarthria, and confusion, now resolved.  Stroke protocol CT without evidence of hemorrhage, large territory stroke, high grade stenoses, medium or large vessel occlusion.  CT perfusion was a normal study.  He is not a thrombolytic candidate given resolution of symptoms.  In absence of vascular risk factors, and normal stroke protocol CT, and atypical stuttering pattern of symptoms, I have low suspicion for ischemic process, and I do not think he experienced a TIA event.  Suspect more likely conversion disorder.  I do not recommend any additional neurodiagnostics or treatment at this time.      Problem list:   Transient neurologic symptoms    Recommendations:   - I do not recommend IV-thrombolytic therapy   - I do not recommend any additional stroke diagnostics at this time, given low suspicion for ischemic process   - please reconsult Stroke Neurology with any additional questions or concerns    Patient discussed with Dr. King, ER attending.    Abdias Carrillo MD  Vascular Neurology    "

## 2024-09-22 ENCOUNTER — APPOINTMENT (OUTPATIENT)
Dept: CARDIOLOGY | Facility: MEDICAL CENTER | Age: 64
End: 2024-09-22
Payer: MEDICARE

## 2024-09-22 VITALS
SYSTOLIC BLOOD PRESSURE: 124 MMHG | BODY MASS INDEX: 21.3 KG/M2 | HEART RATE: 95 BPM | OXYGEN SATURATION: 94 % | WEIGHT: 160.72 LBS | RESPIRATION RATE: 16 BRPM | DIASTOLIC BLOOD PRESSURE: 93 MMHG | TEMPERATURE: 98 F | HEIGHT: 73 IN

## 2024-09-22 LAB
ALBUMIN SERPL BCP-MCNC: 4.2 G/DL (ref 3.2–4.9)
ALBUMIN/GLOB SERPL: 1.6 G/DL
ALP SERPL-CCNC: 68 U/L (ref 30–99)
ALT SERPL-CCNC: 62 U/L (ref 2–50)
ANION GAP SERPL CALC-SCNC: 14 MMOL/L (ref 7–16)
AST SERPL-CCNC: 89 U/L (ref 12–45)
BILIRUB SERPL-MCNC: 0.8 MG/DL (ref 0.1–1.5)
BUN SERPL-MCNC: 22 MG/DL (ref 8–22)
CALCIUM ALBUM COR SERPL-MCNC: 8.6 MG/DL (ref 8.5–10.5)
CALCIUM SERPL-MCNC: 8.8 MG/DL (ref 8.5–10.5)
CHLORIDE SERPL-SCNC: 102 MMOL/L (ref 96–112)
CHOLEST SERPL-MCNC: 175 MG/DL (ref 100–199)
CO2 SERPL-SCNC: 21 MMOL/L (ref 20–33)
CREAT SERPL-MCNC: 0.91 MG/DL (ref 0.5–1.4)
ERYTHROCYTE [DISTWIDTH] IN BLOOD BY AUTOMATED COUNT: 48 FL (ref 35.9–50)
GFR SERPLBLD CREATININE-BSD FMLA CKD-EPI: 95 ML/MIN/1.73 M 2
GLOBULIN SER CALC-MCNC: 2.7 G/DL (ref 1.9–3.5)
GLUCOSE SERPL-MCNC: 131 MG/DL (ref 65–99)
HCT VFR BLD AUTO: 41.3 % (ref 42–52)
HDLC SERPL-MCNC: 71 MG/DL
HGB BLD-MCNC: 14.2 G/DL (ref 14–18)
LDLC SERPL CALC-MCNC: 90 MG/DL
MCH RBC QN AUTO: 32.3 PG (ref 27–33)
MCHC RBC AUTO-ENTMCNC: 34.4 G/DL (ref 32.3–36.5)
MCV RBC AUTO: 93.9 FL (ref 81.4–97.8)
PLATELET # BLD AUTO: 211 K/UL (ref 164–446)
PMV BLD AUTO: 8.8 FL (ref 9–12.9)
POTASSIUM SERPL-SCNC: 3.9 MMOL/L (ref 3.6–5.5)
PROT SERPL-MCNC: 6.9 G/DL (ref 6–8.2)
RBC # BLD AUTO: 4.4 M/UL (ref 4.7–6.1)
SODIUM SERPL-SCNC: 137 MMOL/L (ref 135–145)
TRIGL SERPL-MCNC: 70 MG/DL (ref 0–149)
WBC # BLD AUTO: 9.6 K/UL (ref 4.8–10.8)

## 2024-09-22 PROCEDURE — G0378 HOSPITAL OBSERVATION PER HR: HCPCS

## 2024-09-22 PROCEDURE — 85027 COMPLETE CBC AUTOMATED: CPT

## 2024-09-22 PROCEDURE — 700102 HCHG RX REV CODE 250 W/ 637 OVERRIDE(OP)

## 2024-09-22 PROCEDURE — 80061 LIPID PANEL: CPT

## 2024-09-22 PROCEDURE — 36415 COLL VENOUS BLD VENIPUNCTURE: CPT

## 2024-09-22 PROCEDURE — A9270 NON-COVERED ITEM OR SERVICE: HCPCS

## 2024-09-22 PROCEDURE — 80053 COMPREHEN METABOLIC PANEL: CPT

## 2024-09-22 PROCEDURE — 99239 HOSP IP/OBS DSCHRG MGMT >30: CPT | Performed by: INTERNAL MEDICINE

## 2024-09-22 RX ADMIN — ASPIRIN 81 MG: 81 TABLET, CHEWABLE ORAL at 05:12

## 2024-09-22 NOTE — PROGRESS NOTES
4 Eyes Skin Assessment Completed by JULIA Magaña and atul Cheatham.    Head WDL  Ears WDL  Nose WDL  Mouth WDL  Neck WDL  Breast/Chest WDL  Shoulder Blades WDL  Spine WDL  (R) Arm/Elbow/Hand Scab  (L) Arm/Elbow/Hand WDL  Abdomen WDL  Groin Redness  Scrotum/Coccyx/Buttocks Redness and Non-Blanching  (R) Leg WDL  (L) Leg WDL  (R) Heel/Foot/Toe WDL  (L) Heel/Foot/Toe WDL          Devices In Places Tele Box and Pulse Ox      Interventions In Place Sacral Mepilex, Pillows, and Pressure Redistribution Mattress    Possible Skin Injury Yes    Pictures Uploaded Into Epic Yes  Wound Consult Placed Yes  RN Wound Prevention Protocol Ordered Yes

## 2024-09-22 NOTE — PROGRESS NOTES
Report received from previous shift RN, assumed pt care. Assessment performed. NIH 5. Pt intermittently unable to follow commands. VSS. A/Ox4. Denies pain, nausea, dizziness. Call light and personal belongings in reach. Fall precautions in place. Pt has no further needs at this time.

## 2024-09-22 NOTE — PROGRESS NOTES
Bedside shift report received from day shift RN. Patient A&Ox4, in bed resting comfortably. Per day shift nurse, pt's LOC waxes and wanes. Pt reports abdominal pain, but denies pain on recheck. Bed in lowest, locked position with call light and belongings within reach. Fall precautions reviewed with patient. Frame and strip bed alarms in place. Patient updated on plan of care.

## 2024-09-22 NOTE — PROGRESS NOTES
Unable to complete full neuro assessment and NIHSS. Pt LOC waxes and wanes. On initial assessment, pt was AxOx4 and able to follow commands and writing notes on paper. Pt was impulsive and exhibiting flight of ideas at that time. On recheck, pt with intermittent expressive aphasia and unable to follow most commands. Shortly after, pt answers questions and follows commands.     2315- On recheck, pt AxOx4, able to follow commands and answer questions for admission profile.     0100- Pt able to complete q4 neuro check, following commands with normal speech.

## 2024-09-22 NOTE — ASSESSMENT & PLAN NOTE
- Not currently on any antihypertensives  - IV hydralazine available as needed  - Vitals every 4 hours

## 2024-09-22 NOTE — DISCHARGE SUMMARY
Discharge Summary    CHIEF COMPLAINT ON ADMISSION  Chief Complaint   Patient presents with    ALOC     Pt was found in lobby of his apartment complex confused, with slurred speech. En-route for EMS pt was A &O x 2, GCS 14, with difficulty speaking. On hospital arrival pt became aphasic.        Reason for Admission  Stroke     Admission Date  9/21/2024    CODE STATUS  Full Code    HPI & HOSPITAL COURSE  63-year-old male with hypertension, anxiety, bipolar 1 disorder major depressive disorder who came into the hospital with acute loss of consciousness.  He was found by people living in his apartment complex to be confused with wide constellation of neurological symptoms.  He came to the ER and neurology was consulted and they felt that the etiology of his medical issues is unlikely to be neurological in origin.  He was admitted to the hospital and had no telemetric issues overnight.  His MRI brain was normal.  He does not require an echocardiogram.  He clearly is hypomanic and is already established with Jesse Espinal who is an APRN psychological nurse.  He already has enough of his psychiatric meds at home and he has no evidence of SI or HI.  He is deemed stable for discharge home.  Likely most of his symptoms are functional in etiology.    Therefore, he is discharged in good and stable condition to home with close outpatient follow-up.    Discharge Date  9/22/2024    FOLLOW UP ITEMS POST DISCHARGE  F/U with Jesse Espinal as outlined above    DISCHARGE DIAGNOSES  Principal Problem:    Stroke-like symptoms (POA: Yes)  Active Problems:    Moderate episode of recurrent major depressive disorder (HCC) (Chronic) (POA: Yes)    Hyperlipidemia (POA: Yes)    Bipolar 1 disorder (HCC) (Chronic) (POA: Yes)    History of hypertension (POA: Yes)    Anxiety (POA: Yes)  Resolved Problems:    * No resolved hospital problems. *      FOLLOW UP  No future appointments.  Jesse Espinal A.P.R.N.  [address] [facility]  [id number]  [phone]    Follow up in 1 week(s)  Post admission follow up      MEDICATIONS ON DISCHARGE     Medication List        CONTINUE taking these medications        Instructions   Clonazepam 0.25 MG Tbdp   Take 1 Tablet by mouth 2 times a day as needed (ANXIETY) for up to 90 days.  Dose: 0.25 mg     PARoxetine 20 MG Tabs  Commonly known as: Paxil   Take 1 Tablet by mouth at bedtime.  Dose: 20 mg     quetiapine 200 MG XR tablet  Commonly known as: SEROquel XR   Take 1 Tablet by mouth at bedtime.  Dose: 200 mg     therapeutic multivitamin-minerals Tabs   Take 1 Tablet by mouth every day.  Dose: 1 Tablet              Allergies  No Known Allergies    DIET  Orders Placed This Encounter   Procedures    Diet Order Diet: Regular     Standing Status:   Standing     Number of Occurrences:   1     Order Specific Question:   Diet:     Answer:   Regular [1]       ACTIVITY  As tolerated.  Weight bearing as tolerated    CONSULTATIONS  Neuro    PROCEDURES  MR-BRAIN-W/O   Final Result      MRI of the brain without contrast within normal limits.      [id number] VIEW   Final Result      No contraindication to MRI      CT-CTA NECK WITH & W/O-POST PROCESSING   Final Result      1.  No evidence of carotid or vertebral arterial occlusion or dissection.      2.  Mild atherosclerotic plaque involving the carotid bifurcations bilaterally      CT-CTA HEAD WITH & W/O-POST PROCESS   Final Result      1.  CT angiogram of the [place] within normal limits.      2.  Anatomic variant carotid origin of left posterior cerebral artery.      DX-CHEST-PORTABLE (1 VIEW)   Final Result      No acute cardiopulmonary abnormality identified.      CT-CEREBRAL PERFUSION ANALYSIS   Final Result      1. Cerebral blood flow less than 30% possibly representing completed infarct = 0 mL. Based on distribution of this finding, this is unlikely to represent artifact.      2. T Max more than 6 seconds possibly representing combination of completed  "infarct and ischemia = 0 mL. Based on the distribution of this finding, this is unlikely to represent artifact.      3. Mismatched volume possibly representing ischemic brain/penumbra= 0 mL      4.  Please note that this cerebral perfusion study and report is Quantitative and targets supratentorial (cerebral) perfusion for evaluation of large vessel territory acute ischemia/infarction. For example, lacunar infarcts, and brainstem/posterior fossa    ischemia/infarction are not evaluated on this study.  Data acquisition is subject to artifacts which can yield non-anatomically plausible perfusion maps which may be due to motion, bolus timing, signal to noise ratio, or other technical factors.    Perfusion map abnormalities which show non-anatomic distributions are likely artifact.   This study is not \"stand-alone\" and should only be utilized for diagnosis, management/treatment in correlation with CT, CTA, and/or MRI and clinical factors.         CT-HEAD W/O   Final Result      No evidence of acute intracranial process.               EC-ECHOCARDIOGRAM COMPLETE W/O CONT    (Results Pending)         LABORATORY  Lab Results   Component Value Date    SODIUM 137 09/22/2024    POTASSIUM 3.9 09/22/2024    CHLORIDE 102 09/22/2024    CO2 21 09/22/2024    GLUCOSE 131 (H) 09/22/2024    BUN 22 09/22/2024    CREATININE 0.91 09/22/2024        Lab Results   Component Value Date    WBC 9.6 09/22/2024    HEMOGLOBIN 14.2 09/22/2024    HEMATOCRIT 41.3 (L) 09/22/2024    PLATELETCT 211 09/22/2024        Total time of the discharge process exceeds 32 minutes.  "

## 2024-09-22 NOTE — DISCHARGE INSTRUCTIONS
Diet    Heart Healthy Diet    A Heart-Healthy diet includes increasing healthy fats and limiting unhealthy fats.  Focus on eating a balance of foods, including fruits and vegetables, low-fat or nonfat dairy, lean protein, nuts and legumes, whole grains, and heart-healthy oils and fats.  Monitor your salt (sodium) intake, especially if you have high blood pressure.  Lose weight if you are overweight. Losing just 5-10% of your body weight can help your overall health and prevent diseases such as diabetes and heart disease.      Activity    Resume Your Normal Activity    You may resume your normal activity as tolerated.  Rest as needed.

## 2024-09-22 NOTE — PROGRESS NOTES
Patient transported from ED with tele box in place. Confirmed with monitor room. Patient is intermittently cooperative and following commands though often will also not follow commands or answer questions. Patient not answering most questions, but when provided with a warm blanket then states, 'well now I can talk to you'. Unable to fully complete NIH scale due to lack of cooperation.    Large area of non-blanchable redness to sacrum.  Photo uploaded, mepilex placed, q2h turns initiated, wound consult placed. Condom cath in place as patients shorts were soaked in urine upon arrival.    Bed alarm on, call light within reach. Patient educated to call for assistance before getting out of bed. Room near nursing station.

## 2024-09-22 NOTE — THERAPY
Occupational Therapy Contact Note    Patient Name: David Bowling  Age:  63 y.o., Sex:  male  Medical Record #: [id number]  Today's Date: 9/22/2024    OT orders received. Per RN, pt is at baseline and has no acute OT needs. Will complete OT orders at this time. Please re-consult should the pt have a change in status.

## 2024-09-22 NOTE — PROGRESS NOTES
Notify patient. Diabetes is controlled. Kidney functions are stable. Thyroid is in normal range. Chol is high. Take Rosuvastatin 40 mg daily and follow low chol diet. Recheck labs in 3 mon. Follow-up as planned. Pt d/c via WC to self care. Pt adamant to walk home; ambulates with steady gait. PIV removed. All belongings with pt.

## 2024-09-22 NOTE — CARE PLAN
PT attempted to contact pt after second NS. Same message was rec'd that her VM has not been set up yet. Message sent to NP that pt has not been back. Confirmed with scheduling that they spoke with pt when making these appts.   The patient is Stable - Low risk of patient condition declining or worsening    Shift Goals  Clinical Goals: neuro checks, NIH qShift, pt safety  Patient Goals: feel better  Family Goals: JESU    Progress made toward(s) clinical / shift goals:    Problem: Optimal Care of the Stroke Patient  Goal: Optimal emergency care for the stroke patient  Outcome: Progressing  Goal: Optimal acute care for the stroke patient  Outcome: Progressing     Problem: Knowledge Deficit - Stroke Education  Goal: Patient's knowledge of stroke and risk factors will improve  Outcome: Progressing     Problem: Psychosocial - Patient Condition  Goal: Patient's ability to verbalize feelings about condition will improve  Outcome: Progressing  Goal: Patient's ability to re-evaluate and adapt role responsibilities will improve  Outcome: Progressing     Problem: Discharge Planning - Stroke  Goal: Ensure Stroke Core Measures are met prior to discharge  Outcome: Progressing  Goal: Patient’s continuum of care needs will be met  Outcome: Progressing     Problem: Neuro Status  Goal: Neuro status will remain stable or improve  Outcome: Progressing     Problem: Hemodynamic Monitoring  Goal: Patient's hemodynamics, fluid balance and neurologic status will be stable or improve  Outcome: Progressing     Problem: Respiratory - Stroke Patient  Goal: Patient will achieve/maintain optimum respiratory rate/effort  Outcome: Progressing     Problem: Dysphagia  Goal: Dysphagia will improve  Outcome: Met     Problem: Risk for Aspiration  Goal: Patient's risk for aspiration will be absent or decrease  Outcome: Met     Problem: Urinary Elimination  Goal: Establish and maintain regular urinary output  Outcome: Progressing     Problem: Bowel Elimination  Goal: Establish and maintain regular bowel function  Outcome: Progressing     Problem: Mobility - Stroke  Goal: Patient's capacity to carry out activities will improve  Outcome: Progressing     Problem: Self Care  Goal:  Patient will have the ability to perform ADLs independently or with assistance (bathe, groom, dress, toilet and feed)  Outcome: Progressing     Problem: Knowledge Deficit - Standard  Goal: Patient and family/care givers will demonstrate understanding of plan of care, disease process/condition, diagnostic tests and medications  Outcome: Progressing     Problem: Pain - Standard  Goal: Alleviation of pain or a reduction in pain to the patient’s comfort goal  Outcome: Progressing     Problem: Fall Risk  Goal: Patient will remain free from falls  Outcome: Progressing    Pt impulsive and has flight of thoughts, but back to baseline. Pt updated on POC. Expected to d/c today.       Patient is not progressing towards the following goals:

## 2024-09-22 NOTE — ASSESSMENT & PLAN NOTE
- Patient presents with altered mental status and difficulty speaking  - Code stroke was called in the ER and neurology evaluated  - Neurology felt symptoms were mostly psychosomatic and felt low suspicion for stroke.  However ERP still concerned for stroke and patient is insistent regarding his symptoms.  - CTA head/neck negative  - Telemetry monitoring  - Every 4 neurochecks  - TSH  - Hemoglobin A1c  - ASA  - Lipid panel in a.m  - ECHO (no previous echo on file)  - SLP/PT/OT  - MRI brain  -Reconsult neurology if any new information

## 2024-09-22 NOTE — CARE PLAN
The patient is Stable - Low risk of patient condition declining or worsening    Shift Goals  Clinical Goals: MRI brain, neuro checks, echo  Patient Goals: test results, feel better  Family Goals: JESU    Problem: Optimal Care of the Stroke Patient  Goal: Optimal acute care for the stroke patient  Description: Target End Date:  9/23/24    - Vital signs and neuro checks performed and documented per order  - NIHSS completed and documented per order  - Continuous telemetry monitoring for 72 hours or until discontinued by provider  - Head CT without contrast obtained  - Consideration of MRI/MRA  - MRI screening form completed in worklist if MRI ordered  - Echocardiogram with Bubble Study ordered/completed with consideration of ESVIN  - Carotid Doppler ordered/completed (Not required if CTA of neck completed in ED)  - Lipid Panel obtained within 48 hours of admission  - PT, PTT, INR obtained per Anticoagulation orders (if applicable)  - Antithrombotic therapy by end of hospital day 2 for ischemic stroke. Provider must document reason if contraindicated.  - Venous Thromboembolism (VTE) Prophylaxis by end of hospital day 2 for ischemic and hemorrhagic stroke. Provider must document reason if contraindicated  - Dysphagia screen completed and documented prior to any PO intake. Patient to remain NPO until Speech Therapy evaluation if thrombolytic or thrombectomy performed  - Rehabilitation assessment including PT/OT/SLP evaluations for referral to Physical Medicine and Rehabilitation services. If none needed, provider needs to document reason  - Neurology consult placed  Outcome: Progressing     Problem: Knowledge Deficit - Stroke Education  Goal: Patient's knowledge of stroke and risk factors will improve  Description: Target End Date:   9/23/24    1.  Stroke education booklet provided  2.  Education regarding EMS activation, need for follow up, medication prescribed at discharge, risk factors for stroke/lifestyle modifications,  warning signs and symptoms of stroke provided  Outcome: Progressing     Problem: Neuro Status  Goal: Neuro status will remain stable or improve  Description: Target End Date:   9/23/24    1.  Assess and monitor neurologic status per provider order/protocol/unit policy  2.  Assess level of consciousness and orientation  3.  Assess for speech, dysarthria, dysphagia, facial symmetry  4.  Assess visual field, eye movements, gaze preference, pupil reaction and size  5.  Assess muscle strength and motor response in all four extremities  6.  Assess for sensation (numbness and tingling)  7.  Assess basic neuro reflexes (cough, gag, corneal)  8.  Identify changes in neuro status and report to provider for testing/treatment orders  Outcome: Progressing     Problem: Mobility - Stroke  Goal: Patient's capacity to carry out activities will improve  Description: Target End Date:   9/23/24    1.  Assess for barriers to mobility/activity  2.  Implement activity per interdisciplinary team recommendations  3.  Target activity level identified and patient aware of goal  4.  Provide assistive devices  5.  Instruct patient on proper use of assistive/adaptive devices  6.  Schedule activities and rest periods to decrease effects of fatigue  7.  Encourage mobilization to extent of ability  8.  Maintain proper body alignment  9.  Provide adequate pain management to allow progressive mobilization  Outcome: Progressing

## 2024-09-24 ENCOUNTER — HOSPITAL ENCOUNTER (EMERGENCY)
Facility: MEDICAL CENTER | Age: 64
End: 2024-09-25
Attending: EMERGENCY MEDICINE
Payer: MEDICARE

## 2024-09-24 DIAGNOSIS — R41.0 DELIRIUM: ICD-10-CM

## 2024-09-24 DIAGNOSIS — F23 ACUTE PSYCHOSIS (HCC): ICD-10-CM

## 2024-09-24 LAB
ALBUMIN SERPL BCP-MCNC: 4.3 G/DL (ref 3.2–4.9)
ALBUMIN/GLOB SERPL: 1.7 G/DL
ALP SERPL-CCNC: 67 U/L (ref 30–99)
ALT SERPL-CCNC: 72 U/L (ref 2–50)
ANION GAP SERPL CALC-SCNC: 17 MMOL/L (ref 7–16)
AST SERPL-CCNC: 85 U/L (ref 12–45)
BASOPHILS # BLD AUTO: 0.2 % (ref 0–1.8)
BASOPHILS # BLD: 0.02 K/UL (ref 0–0.12)
BILIRUB SERPL-MCNC: 0.9 MG/DL (ref 0.1–1.5)
BUN SERPL-MCNC: 33 MG/DL (ref 8–22)
CALCIUM ALBUM COR SERPL-MCNC: 8.9 MG/DL (ref 8.5–10.5)
CALCIUM SERPL-MCNC: 9.1 MG/DL (ref 8.5–10.5)
CHLORIDE SERPL-SCNC: 101 MMOL/L (ref 96–112)
CO2 SERPL-SCNC: 20 MMOL/L (ref 20–33)
CREAT SERPL-MCNC: 1.08 MG/DL (ref 0.5–1.4)
EOSINOPHIL # BLD AUTO: 0.02 K/UL (ref 0–0.51)
EOSINOPHIL NFR BLD: 0.2 % (ref 0–6.9)
ERYTHROCYTE [DISTWIDTH] IN BLOOD BY AUTOMATED COUNT: 46.1 FL (ref 35.9–50)
ETHANOL BLD-MCNC: <10.1 MG/DL
GFR SERPLBLD CREATININE-BSD FMLA CKD-EPI: 78 ML/MIN/1.73 M 2
GLOBULIN SER CALC-MCNC: 2.6 G/DL (ref 1.9–3.5)
GLUCOSE SERPL-MCNC: 98 MG/DL (ref 65–99)
HCT VFR BLD AUTO: 40.6 % (ref 42–52)
HGB BLD-MCNC: 13.9 G/DL (ref 14–18)
IMM GRANULOCYTES # BLD AUTO: 0.03 K/UL (ref 0–0.11)
IMM GRANULOCYTES NFR BLD AUTO: 0.3 % (ref 0–0.9)
LYMPHOCYTES # BLD AUTO: 1.7 K/UL (ref 1–4.8)
LYMPHOCYTES NFR BLD: 16.5 % (ref 22–41)
MCH RBC QN AUTO: 32.5 PG (ref 27–33)
MCHC RBC AUTO-ENTMCNC: 34.2 G/DL (ref 32.3–36.5)
MCV RBC AUTO: 94.9 FL (ref 81.4–97.8)
MONOCYTES # BLD AUTO: 0.82 K/UL (ref 0–0.85)
MONOCYTES NFR BLD AUTO: 8 % (ref 0–13.4)
NEUTROPHILS # BLD AUTO: 7.7 K/UL (ref 1.82–7.42)
NEUTROPHILS NFR BLD: 74.8 % (ref 44–72)
NRBC # BLD AUTO: 0 K/UL
NRBC BLD-RTO: 0 /100 WBC (ref 0–0.2)
PLATELET # BLD AUTO: 230 K/UL (ref 164–446)
PMV BLD AUTO: 8.6 FL (ref 9–12.9)
POTASSIUM SERPL-SCNC: 3.9 MMOL/L (ref 3.6–5.5)
PROT SERPL-MCNC: 6.9 G/DL (ref 6–8.2)
RBC # BLD AUTO: 4.28 M/UL (ref 4.7–6.1)
SODIUM SERPL-SCNC: 138 MMOL/L (ref 135–145)
WBC # BLD AUTO: 10.3 K/UL (ref 4.8–10.8)

## 2024-09-24 PROCEDURE — A9270 NON-COVERED ITEM OR SERVICE: HCPCS | Performed by: EMERGENCY MEDICINE

## 2024-09-24 PROCEDURE — 82077 ASSAY SPEC XCP UR&BREATH IA: CPT

## 2024-09-24 PROCEDURE — 36415 COLL VENOUS BLD VENIPUNCTURE: CPT

## 2024-09-24 PROCEDURE — 700102 HCHG RX REV CODE 250 W/ 637 OVERRIDE(OP): Performed by: EMERGENCY MEDICINE

## 2024-09-24 PROCEDURE — 80307 DRUG TEST PRSMV CHEM ANLYZR: CPT

## 2024-09-24 PROCEDURE — 96374 THER/PROPH/DIAG INJ IV PUSH: CPT

## 2024-09-24 PROCEDURE — 85025 COMPLETE CBC W/AUTO DIFF WBC: CPT

## 2024-09-24 PROCEDURE — 99285 EMERGENCY DEPT VISIT HI MDM: CPT

## 2024-09-24 PROCEDURE — 700111 HCHG RX REV CODE 636 W/ 250 OVERRIDE (IP): Mod: JZ | Performed by: EMERGENCY MEDICINE

## 2024-09-24 PROCEDURE — 80053 COMPREHEN METABOLIC PANEL: CPT

## 2024-09-24 PROCEDURE — 90791 PSYCH DIAGNOSTIC EVALUATION: CPT

## 2024-09-24 RX ORDER — PAROXETINE 20 MG/1
20 TABLET, FILM COATED ORAL
Status: DISCONTINUED | OUTPATIENT
Start: 2024-09-24 | End: 2024-09-25 | Stop reason: HOSPADM

## 2024-09-24 RX ORDER — LORAZEPAM 2 MG/ML
1 INJECTION INTRAMUSCULAR ONCE
Status: COMPLETED | OUTPATIENT
Start: 2024-09-24 | End: 2024-09-24

## 2024-09-24 RX ORDER — CLONAZEPAM 0.5 MG/1
0.25 TABLET ORAL 2 TIMES DAILY PRN
Status: DISCONTINUED | OUTPATIENT
Start: 2024-09-24 | End: 2024-09-25 | Stop reason: HOSPADM

## 2024-09-24 RX ORDER — QUETIAPINE FUMARATE 100 MG/1
200 TABLET, FILM COATED ORAL NIGHTLY
Status: DISCONTINUED | OUTPATIENT
Start: 2024-09-24 | End: 2024-09-25 | Stop reason: HOSPADM

## 2024-09-24 RX ORDER — M-VIT,TX,IRON,MINS/CALC/FOLIC 27MG-0.4MG
1 TABLET ORAL DAILY
Status: DISCONTINUED | OUTPATIENT
Start: 2024-09-25 | End: 2024-09-25 | Stop reason: HOSPADM

## 2024-09-24 RX ADMIN — PAROXETINE HYDROCHLORIDE 20 MG: 20 TABLET, FILM COATED ORAL at 21:47

## 2024-09-24 RX ADMIN — LORAZEPAM 1 MG: 2 INJECTION INTRAMUSCULAR; INTRAVENOUS at 12:34

## 2024-09-24 RX ADMIN — QUETIAPINE 200 MG: 100 TABLET, FILM COATED ORAL at 21:47

## 2024-09-24 RX ADMIN — CLONAZEPAM 0.25 MG: 0.5 TABLET ORAL at 21:47

## 2024-09-24 ASSESSMENT — FIBROSIS 4 INDEX: FIB4 SCORE: 3.27

## 2024-09-24 NOTE — ED NOTES
Pt placed on legal hold by alert team RN. Pt changed into paper scrubs and all belongings removed from room. Security called for belongings search.   Care companion at bedside.

## 2024-09-24 NOTE — ED TRIAGE NOTES
Chief Complaint   Patient presents with    ALOC    Psych Eval       62 yo M BIB EMS for above. Pt was walking around an apartment complex when [employer] was called. On ED arrival pt restless and responding to internal stimuli. Pt having difficulty focusing on staff/answering triage questions, pt denies SI/IHI.       Chart up for ERP.

## 2024-09-24 NOTE — ED PROVIDER NOTES
ED Provider Note    CHIEF COMPLAINT  Chief Complaint   Patient presents with    ALOC    Psych Eval       EXTERNAL RECORDS REVIEWED  Reviewed records from recent hospitalization    HPI/ROS  LIMITATION TO HISTORY   Altered mental status, possible drug intoxication  OUTSIDE HISTORIAN(S):  None.    David Bowling is a 63 y.o. male who presents to the emergency department altered, wandering and acting agitated and responding to internal stimuli.  Per report from nursing staff he might of had a type and drug paraphernalia.    The patient is difficult to direct when I initially see him.  He states he wants his med change.  He is having a hard time answering direct questions.  He seems agitated and tachycardic.  He denies any pain or trauma.  Denies any other acute concerns or complaints however his history is difficult to obtain because of his mentation.        PAST MEDICAL HISTORY   has a past medical history of Bipolar 1 disorder (HCC) (03/08/2024), Hyperlipidemia (10/19/2015), Lumbar disc herniation (10/19/2015), Melanoma of neck ([facility]), Moderate episode of recurrent major depressive disorder (HCC) (10/19/2015), SLAP lesion of shoulder, and Spinal stenosis at L4-L5 level (10/19/2015).    SURGICAL HISTORY   has a past surgical history that includes shoulder surgery (Left); wide excision melanoma, leg, w/poss.stsg; and orif, forearm (Right).    FAMILY HISTORY  No family history on file.    SOCIAL HISTORY  Social History     Tobacco Use    Smoking status: Some Days     Types: Cigarettes    Smokeless tobacco: Never   Vaping Use    Vaping status: Never Used   Substance and Sexual Activity    Alcohol use: Yes     Alcohol/week: 8.4 oz     Types: 14 Cans of beer per week    Drug use: Yes     Types: Marijuana    Sexual activity: Not Currently       CURRENT MEDICATIONS  Home Medications    **Home medications have not yet been reviewed for this encounter**         ALLERGIES  No Known Allergies    PHYSICAL EXAM  VITAL SIGNS: BP  "127/83   Pulse (!) 102   Temp 37.6 °C (99.7 °F) (Temporal)   Resp 18   Ht 1.854 m (6' 1\")   Wt 72.6 kg (160 lb)   SpO2 94%   BMI 21.11 kg/m²    Constitutional: Awake alert writhing in the gurney no acute cardiopulmonary stress.  HENT: Normocephalic, Atraumatic, Bilateral external ears normal, dry mucous membranes  Eyes: PERRL, EOMI, Conjunctiva normal, No discharge.   Neck: Normal range of motion,  Cardiovascular: Normal heart rate, Normal rhythm, No murmurs,  Thorax & Lungs: Normal breath sounds, No respiratory distress, No wheezing,  Abdomen: Bowel sounds normal, Soft, No tenderness  Skin: Warm, Dry, No erythema, No rash.   Back: No tenderness, No CVA tenderness.   Musculoskeletal: Good range of motion in all major joints.  Neurologic: Alert, moves all extremities.  Unable to answer orientation questions.  Psychiatric: Unable to assess.  He is awake he seems to respond to internal stimuli does not answer questions around hallucinations or suicidal ideation.      EKG/LABS  Results for orders placed or performed during the hospital encounter of 09/24/24   CBC WITH DIFFERENTIAL   Result Value Ref Range    WBC 10.3 4.8 - 10.8 K/uL    RBC 4.28 (L) 4.70 - 6.10 M/uL    Hemoglobin 13.9 (L) 14.0 - 18.0 g/dL    Hematocrit 40.6 (L) 42.0 - 52.0 %    MCV 94.9 81.4 - 97.8 fL    MCH 32.5 27.0 - 33.0 pg    MCHC 34.2 32.3 - 36.5 g/dL    RDW 46.1 35.9 - 50.0 fL    Platelet Count 230 164 - 446 K/uL    MPV 8.6 (L) 9.0 - 12.9 fL    Neutrophils-Polys 74.80 (H) 44.00 - 72.00 %    Lymphocytes 16.50 (L) 22.00 - 41.00 %    Monocytes 8.00 0.00 - 13.40 %    Eosinophils 0.20 0.00 - 6.90 %    Basophils 0.20 0.00 - 1.80 %    Immature Granulocytes 0.30 0.00 - 0.90 %    Nucleated RBC 0.00 0.00 - 0.20 /100 WBC    Neutrophils (Absolute) 7.70 (H) 1.82 - 7.42 K/uL    Lymphs (Absolute) 1.70 1.00 - 4.80 K/uL    Monos (Absolute) 0.82 0.00 - 0.85 K/uL    Eos (Absolute) 0.02 0.00 - 0.51 K/uL    Baso (Absolute) 0.02 0.00 - 0.12 K/uL    Immature " Granulocytes (abs) 0.03 0.00 - 0.11 K/uL    NRBC (Absolute) 0.00 K/uL      I have independently interpreted this EKG    RADIOLOGY/PROCEDURES   I have independently interpreted the diagnostic imaging associated with this visit and am waiting the final reading from the radiologist.   No imaging indicated.      COURSE & MEDICAL DECISION MAKING    ASSESSMENT, COURSE AND PLAN  Care Narrative:     63-year-old male presents to the emergency department for altered mentation and acute delirium.  Presumed to be drug or psychiatric in nature.  Patient had a recent hospitalization for a similar complaint.  Workup was unremarkable he is discharged home with a presumed diagnosis of a psychiatric emergency.  Did have a drug screen at that time positive for marijuana.  Per report the patient might of had a fight with him today.    At this point the patient seen examined differential diagnosis includes a metabolic emergency, drug intoxication or decompensated psychiatric disease.    Patient is given some Ativan with some basic labs.    ED OBS: Yes; I am placing the patient in to an observation status due to a diagnostic uncertainty as well as therapeutic intensity. Patient placed in observation status at 1:23 PM, 9/24/2024.     Observation plan is as follows: Admitted to ED observation we sedate him, get a drug screen and work him up.  He may require psychiatric evaluation and require further metabolic workup.  Care transferred to my partner at 1400.              ADDITIONAL PROBLEMS MANAGED  Bipolar disorder.    DISPOSITION AND DISCUSSIONS  I have discussed management of the patient with the following physicians and CAT's: Transfer my partner at 1400.        FINAL DIAGNOSIS  No diagnosis found.     Electronically signed by: Yassine Addison M.D., 9/24/2024 1:21 PM

## 2024-09-25 VITALS
HEART RATE: 84 BPM | BODY MASS INDEX: 21.2 KG/M2 | OXYGEN SATURATION: 96 % | DIASTOLIC BLOOD PRESSURE: 79 MMHG | SYSTOLIC BLOOD PRESSURE: 121 MMHG | TEMPERATURE: 98 F | HEIGHT: 73 IN | WEIGHT: 160 LBS | RESPIRATION RATE: 16 BRPM

## 2024-09-25 LAB
AMPHET UR QL SCN: NEGATIVE
BARBITURATES UR QL SCN: NEGATIVE
BENZODIAZ UR QL SCN: POSITIVE
BZE UR QL SCN: NEGATIVE
CANNABINOIDS UR QL SCN: POSITIVE
FENTANYL UR QL: NEGATIVE
METHADONE UR QL SCN: NEGATIVE
OPIATES UR QL SCN: NEGATIVE
OXYCODONE UR QL SCN: NEGATIVE
PCP UR QL SCN: NEGATIVE
PROPOXYPH UR QL SCN: NEGATIVE

## 2024-09-25 PROCEDURE — A9270 NON-COVERED ITEM OR SERVICE: HCPCS | Performed by: STUDENT IN AN ORGANIZED HEALTH CARE EDUCATION/TRAINING PROGRAM

## 2024-09-25 PROCEDURE — 700102 HCHG RX REV CODE 250 W/ 637 OVERRIDE(OP): Performed by: STUDENT IN AN ORGANIZED HEALTH CARE EDUCATION/TRAINING PROGRAM

## 2024-09-25 RX ORDER — LORAZEPAM 2 MG/1
2 TABLET ORAL ONCE
Status: COMPLETED | OUTPATIENT
Start: 2024-09-25 | End: 2024-09-25

## 2024-09-25 RX ADMIN — LORAZEPAM 2 MG: 2 TABLET ORAL at 01:20

## 2024-09-25 NOTE — ED NOTES
Patient sleeping comfortably, resp even and unlabored, NAD, and no needs at this time.  Patient continues care companion in LOS, room safety complete, checklist in place, and STOP sign posted outside door.

## 2024-09-25 NOTE — CONSULTS
[facility]   TRIAGE ASSESSMENT    Name: David Bowling  MRN: [id number]  : 1960  Age: 63 y.o.  Date of assessment: 2024  PCP: NO Browne  Persons in attendance: Patient  Patient Location: [facility]    CHIEF COMPLAINT/PRESENTING ISSUE (as stated by patient): Pt BIB EMS after being found in an apartment complex exhibiting bizarre behaviors. Pt was admitted to [facility] 24 for ALOC and was medically cleared and symptoms were attributed to a psychiatric cause and pt was discharged home to self. He has a history of bipolar 1 d/o, anxiety, insomnia. He sees JOSEFINA Browne for outpatient psychiatry. Last note lists medications as paroxetine 20mg, clonazepam 0.25mg PRN and quetiapine XR 200mg. Pt is disorganized, is able to answer questions briefly but becomes distracted. He is experiencing visual hallucinations. He points to corners of the room and talks about how he needs to mop, then describes needs to alter his mattress. He has psychomotor agitation.  He denies SI/HI. UDS pending due to pt not following instructions to collect sample. He had a pipe in his possession, but pt states that it is for marijuana. He is unable to provide much history due to psychosis. Legal hold has been certified.   Chief Complaint   Patient presents with    ALOC    Psych Eval        CURRENT LIVING SITUATION/SOCIAL SUPPORT/FINANCIAL RESOURCES: Lives in an apartment. Unable to provide further information.    BEHAVIORAL HEALTH/SUBSTANCE USE TREATMENT HISTORY  Does patient/parent report a history of prior behavioral health/substance use treatment for patient?  Denies history of inpatient treatment.  Yes:    Dates Level of Care Facilty/Provider Diagnosis/Problem Medications   current OP JOSEFINA Browne Bipolar 1, anxiety, insomnia Paroxetine 20mg, quetiapine XR 200mg, clonazepam 0.25mg PRN                                                                      SAFETY ASSESSMENT - SELF  Does  "patient acknowledge current or past symptoms of dangerousness to self or is previous history noted? no  Does parent/significant other report patient has current or past symptoms of dangerousness to self? N\A  Does presenting problem suggest symptoms of dangerousness to self? No    SAFETY ASSESSMENT - OTHERS  Does patient acknowledge current or past symptoms of aggressive behavior or risk to others or is previous history noted? no  Does parent/significant other report patient has current or past symptoms of aggressive behavior or risk to others?  N\A  Does presenting problem suggest symptoms of dangerousness to others? No    LEGAL HISTORY  Does patient acknowledge history of arrest/FCI/prison or is previous history noted? unknown    Crisis Safety Plan completed and copy given to patient? N\A    ABUSE/NEGLECT SCREENING  Unable to assess  SUBSTANCE USE SCREENING  Yes:  Giancarlo all substances used in the past 30 days:      Last Use Amount   []   Alcohol     [x]   Marijuana unknown    []   Heroin     []   Prescription Opioids  (used without prescription, for    recreation, or in excess of prescribed amount)     []   Other Prescription  (used without prescription, for    recreation, or in excess of prescribed amount)     []   Cocaine      []   Methamphetamine     []   \"\" drugs (ectasy, MDMA)     []   Other substances        UDS results: pending  Breathalyzer results: <10.1    What consequences does the patient associate with any of the above substance use and or addictive behaviors? None    Risk factors for detox (check all that apply):  []  Seizures   []  Diaphoretic (sweating)   []  Tremors   []  Hallucinations   []  Increased blood pressure   []  Decreased blood pressure   []  Other   [x]  None      [] Patient education on risk factors for detoxification and instructed to return to ER as needed.      MENTAL STATUS   Participation: Active verbal participation  Grooming: Casual  Orientation: Alert and Evidence of " hallucinations present  Behavior: Agitated  Eye contact: Good  Mood: Anxious  Affect: Anxious  Thought process: disorganized  Thought content: Evidence of delusion  Speech: Rate within normal limits and Volume within normal limits  Perception: Evidence of hallucination  Memory:  Poor memory for chronology of events  Insight: Poor  Judgment:  Poor  Other:    Collateral information:    Source:  [] Significant other present in person:   [] Significant other by telephone  [] [facility]   [] [facility] Nursing Staff  [x] [facility] Medical Record  [] Other:     [] Unable to complete full assessment due to:  [] Acute intoxication  [] Patient declined to participate/engage  [] Patient verbally unresponsive  [] Significant cognitive deficits  [] Significant perceptual distortions or behavioral disorganization  [] Other:      CLINICAL IMPRESSIONS:  Primary:  psychosis/inability to care for self  Secondary:  per chart: bipolar 1, anxiety, insomnia       IDENTIFIED NEEDS/PLAN:  [Trigger DISPOSITION list for any items marked]    []  Imminent safety risk - self [] Imminent safety risk - others   []  Acute substance withdrawal [x]  Psychosis/Impaired reality testing   []  Mood/anxiety []  Substance use/Addictive behavior   []  Maladaptive behaviro []  Parent/child conflict   []  Family/Couples conflict []  Biomedical   []  Housing []  Financial   []   Legal  Occupational/Educational   []  Domestic violence []  Other:     Recommended Plan of Care:  Actively being addressed by Legal [facility] and [facility] Emergency Department, Refer to [facility], [facility], [facility] and [place], and [facility]  *[facility] may not be utilized for moderate or high risk patients    Has the Recommended Plan of Care/Level of Observation been reviewed with the patient's assigned nurse? no    Does patient/parent or guardian express agreement with the above plan? no   Referral appointment(s) scheduled? no    Alert  team only:   I have discussed findings and recommendations with Dr. Lynch who is in agreement with these recommendations.     Referral information sent to the following outpatient community providers :    Referral information sent to the following inpatient community providers : [facility], [facility], [facility], [facility]    If applicable : Referred  to  Alert Team for legal hold follow up at (time): 9/27/24      Demi Olguin R.N.  9/24/2024

## 2024-09-25 NOTE — DISCHARGE PLANNING
TCN following. HTH/SCP chart review completed. Note that pt is currently on legal hold with anticipated plan for dc to [facility] today at 11AM per review. No TCN needs identified at this time. Please reach out to TCN via [facility] if POC/dc planning should change to transitional care needs.

## 2024-09-25 NOTE — ED NOTES
Patient noted to be restless and pacing room by care companion. This RN at bedside and patient calmed with verbal redirection and update on POC. Patient. Care Companion updated on patient ability to ambulate around room independently at this time.

## 2024-09-25 NOTE — PROGRESS NOTES
"ED Observation Progress Note    Date of Service: 09/24/24    Interval History and Interventions  I was asked to reassess the patient by the behavioral health team.  The patient is now more calm and redirectable but continues to have persistent delusions of molds in his room, requesting a mop to clean it up, mild psychomotor agitation.  He reports only marijuana use.  At this point time, his medical workup does not appear to demonstrate a organic cause for his presentation, likely either drug use versus likely combination of mental health and substance use.  Patient is medically cleared, legal hold a certified    Physical Exam  /83   Pulse (!) 102   Temp 37.6 °C (99.7 °F) (Temporal)   Resp 18   Ht 1.854 m (6' 1\")   Wt 72.6 kg (160 lb)   SpO2 94%   BMI 21.11 kg/m² .    Constitutional: Awake and alert. Nontoxic  HENT:  Grossly normal  Eyes: Grossly normal  Neck: Normal range of motion  Cardiovascular: Normal heart rate   Thorax & Lungs: No respiratory distress  Abdomen: Nontender  Skin:  No pathologic rash.   Extremities: Well perfused  Psychiatric: Affect normal, appears to be responding to internal stimuli    Labs  Results for orders placed or performed during the hospital encounter of 09/24/24   CBC WITH DIFFERENTIAL   Result Value Ref Range    WBC 10.3 4.8 - 10.8 K/uL    RBC 4.28 (L) 4.70 - 6.10 M/uL    Hemoglobin 13.9 (L) 14.0 - 18.0 g/dL    Hematocrit 40.6 (L) 42.0 - 52.0 %    MCV 94.9 81.4 - 97.8 fL    MCH 32.5 27.0 - 33.0 pg    MCHC 34.2 32.3 - 36.5 g/dL    RDW 46.1 35.9 - 50.0 fL    Platelet Count 230 164 - 446 K/uL    MPV 8.6 (L) 9.0 - 12.9 fL    Neutrophils-Polys 74.80 (H) 44.00 - 72.00 %    Lymphocytes 16.50 (L) 22.00 - 41.00 %    Monocytes 8.00 0.00 - 13.40 %    Eosinophils 0.20 0.00 - 6.90 %    Basophils 0.20 0.00 - 1.80 %    Immature Granulocytes 0.30 0.00 - 0.90 %    Nucleated RBC 0.00 0.00 - 0.20 /100 WBC    Neutrophils (Absolute) 7.70 (H) 1.82 - 7.42 K/uL    Lymphs (Absolute) 1.70 1.00 - " 4.80 K/uL    Monos (Absolute) 0.82 0.00 - 0.85 K/uL    Eos (Absolute) 0.02 0.00 - 0.51 K/uL    Baso (Absolute) 0.02 0.00 - 0.12 K/uL    Immature Granulocytes (abs) 0.03 0.00 - 0.11 K/uL    NRBC (Absolute) 0.00 K/uL   COMP METABOLIC PANEL   Result Value Ref Range    Sodium 138 135 - 145 mmol/L    Potassium 3.9 3.6 - 5.5 mmol/L    Chloride 101 96 - 112 mmol/L    Co2 20 20 - 33 mmol/L    Anion Gap 17.0 (H) 7.0 - 16.0    Glucose 98 65 - 99 mg/dL    Bun 33 (H) 8 - 22 mg/dL    Creatinine 1.08 0.50 - 1.40 mg/dL    Calcium 9.1 8.5 - 10.5 mg/dL    Correct Calcium 8.9 8.5 - 10.5 mg/dL    AST(SGOT) 85 (H) 12 - 45 U/L    ALT(SGPT) 72 (H) 2 - 50 U/L    Alkaline Phosphatase 67 30 - 99 U/L    Total Bilirubin 0.9 0.1 - 1.5 mg/dL    Albumin 4.3 3.2 - 4.9 g/dL    Total Protein 6.9 6.0 - 8.2 g/dL    Globulin 2.6 1.9 - 3.5 g/dL    A-G Ratio 1.7 g/dL   DIAGNOSTIC ALCOHOL   Result Value Ref Range    Diagnostic Alcohol <10.1 <10.1 mg/dL   ESTIMATED GFR   Result Value Ref Range    GFR (CKD-EPI) 78 >60 mL/min/1.73 m 2     Problem List  1. Delirium        2. Acute psychosis (HCC)              Electronically signed by: Sal Lynch M.D., 9/24/2024 6:25 PM

## 2024-09-25 NOTE — ED NOTES
Patient ambulated to the bathroom independently with steady gait  Urine sample collected and sent  Patient resting comfortably, resp even and unlabored, NAD, and no needs at this time.  Patient continues care companion in LOS, room safety complete, checklist in place, and STOP sign posted outside door.

## 2024-09-25 NOTE — DISCHARGE SUMMARY
"ED Observation Discharge Summary    Patient:David Bowling  Patient : 1960  Patient MRN: [id number]  Patient PCP: NO Browne    Admit Date: 2024  Discharge Date and Time: 24 1115 AM  Discharge Diagnosis:   1. Delirium        2. Acute psychosis (HCC)            Discharge Attending: Cortes Rojo M.D.  Discharge Service: ED Observation    ED Course  David is a 63 y.o. male who was evaluated at [facility] for behavioral health assessment.  Patient with acute psychosis, unable to care for self, was placed on legal hold.  Patient was transferred to psychiatric facility.    Discharge Exam:  /79   Pulse 84   Temp 36.7 °C (98 °F) (Temporal)   Resp 16   Ht 1.854 m (6' 1\")   Wt 72.6 kg (160 lb)   SpO2 96%   BMI 21.11 kg/m² .    Constitutional: Awake and alert. Nontoxic  HENT:  Grossly normal  Eyes: Grossly normal  Neck: Normal range of motion  Cardiovascular: Normal heart rate   Thorax & Lungs: No respiratory distress  Abdomen: Nontender  Skin:  No pathologic rash.   Extremities: Well perfused  Psychiatric: Odd affect    Labs  Results for orders placed or performed during the hospital encounter of 24   CBC WITH DIFFERENTIAL   Result Value Ref Range    WBC 10.3 4.8 - 10.8 K/uL    RBC 4.28 (L) 4.70 - 6.10 M/uL    Hemoglobin 13.9 (L) 14.0 - 18.0 g/dL    Hematocrit 40.6 (L) 42.0 - 52.0 %    MCV 94.9 81.4 - 97.8 fL    MCH 32.5 27.0 - 33.0 pg    MCHC 34.2 32.3 - 36.5 g/dL    RDW 46.1 35.9 - 50.0 fL    Platelet Count 230 164 - 446 K/uL    MPV 8.6 (L) 9.0 - 12.9 fL    Neutrophils-Polys 74.80 (H) 44.00 - 72.00 %    Lymphocytes 16.50 (L) 22.00 - 41.00 %    Monocytes 8.00 0.00 - 13.40 %    Eosinophils 0.20 0.00 - 6.90 %    Basophils 0.20 0.00 - 1.80 %    Immature Granulocytes 0.30 0.00 - 0.90 %    Nucleated RBC 0.00 0.00 - 0.20 /100 WBC    Neutrophils (Absolute) 7.70 (H) 1.82 - 7.42 K/uL    Lymphs (Absolute) 1.70 1.00 - 4.80 K/uL    Monos (Absolute) 0.82 0.00 - 0.85 " K/uL    Eos (Absolute) 0.02 0.00 - 0.51 K/uL    Baso (Absolute) 0.02 0.00 - 0.12 K/uL    Immature Granulocytes (abs) 0.03 0.00 - 0.11 K/uL    NRBC (Absolute) 0.00 K/uL   COMP METABOLIC PANEL   Result Value Ref Range    Sodium 138 135 - 145 mmol/L    Potassium 3.9 3.6 - 5.5 mmol/L    Chloride 101 96 - 112 mmol/L    Co2 20 20 - 33 mmol/L    Anion Gap 17.0 (H) 7.0 - 16.0    Glucose 98 65 - 99 mg/dL    Bun 33 (H) 8 - 22 mg/dL    Creatinine 1.08 0.50 - 1.40 mg/dL    Calcium 9.1 8.5 - 10.5 mg/dL    Correct Calcium 8.9 8.5 - 10.5 mg/dL    AST(SGOT) 85 (H) 12 - 45 U/L    ALT(SGPT) 72 (H) 2 - 50 U/L    Alkaline Phosphatase 67 30 - 99 U/L    Total Bilirubin 0.9 0.1 - 1.5 mg/dL    Albumin 4.3 3.2 - 4.9 g/dL    Total Protein 6.9 6.0 - 8.2 g/dL    Globulin 2.6 1.9 - 3.5 g/dL    A-G Ratio 1.7 g/dL   DIAGNOSTIC ALCOHOL   Result Value Ref Range    Diagnostic Alcohol <10.1 <10.1 mg/dL   URINE DRUG SCREEN   Result Value Ref Range    Amphetamines Urine Negative Negative    Barbiturates Negative Negative    Benzodiazepines Positive (A) Negative    Cocaine Metabolite Negative Negative    Fentanyl, Urine Negative Negative    Methadone Negative Negative    Opiates Negative Negative    Oxycodone Negative Negative    Phencyclidine -Pcp Negative Negative    Propoxyphene Negative Negative    Cannabinoid Metab Positive (A) Negative   ESTIMATED GFR   Result Value Ref Range    GFR (CKD-EPI) 78 >60 mL/min/1.73 m 2       Radiology  No orders to display       Medications:   Discharge Medication List as of 9/25/2024 11:15 AM          My final assessment includes   1. Delirium        2. Acute psychosis (HCC)            Upon Reevaluation, the patient's condition has: Not improved and will be transferred to psychiatric facility.    Patient discharged from ED Observation status at 11:15 AM (Time) 9/25/2024  (Date).     Total time spent on this ED Observation discharge encounter is < 30 Minutes    Electronically signed by: Cortes Rojo M.D.,  9/25/2024 8:47 AM

## 2024-09-25 NOTE — ED NOTES
REMSA here for transfer to [facility].  Provided report, transfer packet and pt belongings.  Pt ambulatory from the ED with steady gait with EMS staff.  Pt a/o4, ambulatory, VSS on transfer.

## 2024-09-25 NOTE — ED NOTES
Bedside report received from off going RN/tech: Sylwia, assumed care of patient.  POC discussed with patient. All needs addressed at this time.       Fall risk interventions in place: Not Applicable (all applicable per [facility] Fall risk assessment)   Continuous monitoring: Not Applicable   IVF/IV medications: Not Applicable   Oxygen: Room Air  Bedside sitter: Care Companion in room  Isolation: Not Applicable

## 2024-09-25 NOTE — ED NOTES
Patient resting comfortably, resp even and unlabored, NAD, and no needs at this time.  Patient continues care companion in LOS, room safety complete, checklist in place, and STOP sign posted outside door.

## 2024-09-25 NOTE — ED NOTES
0730 report rec'd, patient care assumed.  Telesitter in place.      0800  Rounded on patient.  Pt sleeping, respirations even/unlabored.    0900  VS updated, pt updated to POC, all needs met

## 2024-09-25 NOTE — ED NOTES
Patient restless in room, folding and refolding blankets. Patient stating he is going to mop the floors. Multiple attempts made to redirect and reorient patient. Patient cooperative with care and redirection but remains restless. Patient continues care companion in LOS, room safety complete, checklist in place, and STOP sign posted outside door.

## 2024-09-25 NOTE — DISCHARGE PLANNING
[facility] ED Behavioral Health Fax Referral      Referral: Legal Hold    Intervention: Patient referral to community inpatient  facillity    Legal Hold Initiated: Date: 9/24/24 Time:  1430    Patient’s Insurance Listed on Face Sheet: [facility]    Referrals sent to:  [facility], [facility] , [facility] ,  [facility]    Referrals faxed by Demi VAZQUEZ    This referral contains the following information:  Face sheet __x__  Page 1 and Page 2 of Legal Hold _x___  Alert Team Assessment/Psych Assessment __x__  Head to toe physical exam __x__  Urine Drug Screen ____  Blood Alcohol __x__  Vital signs _x___  Pregnancy test when applicable ___  Medications list _x___  Covid screening ____    Plan: Patient will transfer to mental health facility once acceptance is obtained    For all referral information, please contact the  referral office daily between the hours of 7:00 a.m. to 6:00 p.m. at:   Phone [phone]   Fax [phone]    ED  Alert Team   Phone [phone] Fax [phone]    [facility] Emergency Department Contact numbers:  Green Pod [phone]   Blue Pod [phone]   Red Pod [phone]   Pediatrics [phone]

## 2024-09-25 NOTE — DISCHARGE PLANNING
Alert Team:     Referral: Adult Patient Transfer to Mental Health Facility     Intervention: Received call from Nona at [facility] stating that Dr. Enamorado has accepted the patient for admission.  Facility requests that transport be arranged for 11:00 AM.     Arranged for transportation to be set up through [facility] with [facility].     The pt will be picked up at 11 AM.      Notified the RN of the departure time as well as accepting facility.      Created transfer packet and placed on chart.     Plan: Pt will transfer to [facility] at 11 AM.

## 2024-10-04 ENCOUNTER — TELEPHONE (OUTPATIENT)
Dept: HEALTH INFORMATION MANAGEMENT | Facility: OTHER | Age: 64
End: 2024-10-04

## 2024-10-20 ENCOUNTER — APPOINTMENT (OUTPATIENT)
Dept: RADIOLOGY | Facility: MEDICAL CENTER | Age: 64
End: 2024-10-20
Attending: EMERGENCY MEDICINE
Payer: MEDICARE

## 2024-10-20 ENCOUNTER — HOSPITAL ENCOUNTER (EMERGENCY)
Facility: MEDICAL CENTER | Age: 64
End: 2024-10-20
Attending: EMERGENCY MEDICINE
Payer: MEDICARE

## 2024-10-20 ENCOUNTER — PHARMACY VISIT (OUTPATIENT)
Dept: PHARMACY | Facility: MEDICAL CENTER | Age: 64
End: 2024-10-20
Payer: COMMERCIAL

## 2024-10-20 VITALS
WEIGHT: 154.32 LBS | HEART RATE: 96 BPM | RESPIRATION RATE: 18 BRPM | OXYGEN SATURATION: 96 % | SYSTOLIC BLOOD PRESSURE: 131 MMHG | DIASTOLIC BLOOD PRESSURE: 69 MMHG | BODY MASS INDEX: 21.6 KG/M2 | HEIGHT: 71 IN | TEMPERATURE: 98.2 F

## 2024-10-20 DIAGNOSIS — L03.116 CELLULITIS OF LEFT LOWER EXTREMITY: ICD-10-CM

## 2024-10-20 DIAGNOSIS — R60.0 PERIPHERAL EDEMA: ICD-10-CM

## 2024-10-20 DIAGNOSIS — R79.89 ELEVATED TROPONIN: ICD-10-CM

## 2024-10-20 DIAGNOSIS — L03.115 CELLULITIS OF RIGHT LOWER EXTREMITY: ICD-10-CM

## 2024-10-20 LAB
ALBUMIN SERPL BCP-MCNC: 3.6 G/DL (ref 3.2–4.9)
ALBUMIN/GLOB SERPL: 1.3 G/DL
ALP SERPL-CCNC: 74 U/L (ref 30–99)
ALT SERPL-CCNC: 65 U/L (ref 2–50)
ANION GAP SERPL CALC-SCNC: 11 MMOL/L (ref 7–16)
AST SERPL-CCNC: 49 U/L (ref 12–45)
BASOPHILS # BLD AUTO: 0.3 % (ref 0–1.8)
BASOPHILS # BLD: 0.02 K/UL (ref 0–0.12)
BILIRUB SERPL-MCNC: 0.4 MG/DL (ref 0.1–1.5)
BUN SERPL-MCNC: 10 MG/DL (ref 8–22)
CALCIUM ALBUM COR SERPL-MCNC: 9.2 MG/DL (ref 8.5–10.5)
CALCIUM SERPL-MCNC: 8.9 MG/DL (ref 8.5–10.5)
CHLORIDE SERPL-SCNC: 99 MMOL/L (ref 96–112)
CO2 SERPL-SCNC: 26 MMOL/L (ref 20–33)
CREAT SERPL-MCNC: 0.8 MG/DL (ref 0.5–1.4)
EKG IMPRESSION: NORMAL
EOSINOPHIL # BLD AUTO: 0.04 K/UL (ref 0–0.51)
EOSINOPHIL NFR BLD: 0.6 % (ref 0–6.9)
ERYTHROCYTE [DISTWIDTH] IN BLOOD BY AUTOMATED COUNT: 47.4 FL (ref 35.9–50)
GFR SERPLBLD CREATININE-BSD FMLA CKD-EPI: 100 ML/MIN/1.73 M 2
GLOBULIN SER CALC-MCNC: 2.7 G/DL (ref 1.9–3.5)
GLUCOSE SERPL-MCNC: 100 MG/DL (ref 65–99)
HCT VFR BLD AUTO: 37.5 % (ref 42–52)
HGB BLD-MCNC: 12.7 G/DL (ref 14–18)
IMM GRANULOCYTES # BLD AUTO: 0.02 K/UL (ref 0–0.11)
IMM GRANULOCYTES NFR BLD AUTO: 0.3 % (ref 0–0.9)
LYMPHOCYTES # BLD AUTO: 1.59 K/UL (ref 1–4.8)
LYMPHOCYTES NFR BLD: 22.6 % (ref 22–41)
MCH RBC QN AUTO: 31.8 PG (ref 27–33)
MCHC RBC AUTO-ENTMCNC: 33.9 G/DL (ref 32.3–36.5)
MCV RBC AUTO: 94 FL (ref 81.4–97.8)
MONOCYTES # BLD AUTO: 0.63 K/UL (ref 0–0.85)
MONOCYTES NFR BLD AUTO: 8.9 % (ref 0–13.4)
NEUTROPHILS # BLD AUTO: 4.75 K/UL (ref 1.82–7.42)
NEUTROPHILS NFR BLD: 67.3 % (ref 44–72)
NRBC # BLD AUTO: 0 K/UL
NRBC BLD-RTO: 0 /100 WBC (ref 0–0.2)
NT-PROBNP SERPL IA-MCNC: 65 PG/ML (ref 0–125)
PLATELET # BLD AUTO: 222 K/UL (ref 164–446)
PMV BLD AUTO: 7.7 FL (ref 9–12.9)
POTASSIUM SERPL-SCNC: 3.6 MMOL/L (ref 3.6–5.5)
PROT SERPL-MCNC: 6.3 G/DL (ref 6–8.2)
RBC # BLD AUTO: 3.99 M/UL (ref 4.7–6.1)
SODIUM SERPL-SCNC: 136 MMOL/L (ref 135–145)
TROPONIN T SERPL-MCNC: 29 NG/L (ref 6–19)
TROPONIN T SERPL-MCNC: 30 NG/L (ref 6–19)
WBC # BLD AUTO: 7.1 K/UL (ref 4.8–10.8)

## 2024-10-20 PROCEDURE — 700102 HCHG RX REV CODE 250 W/ 637 OVERRIDE(OP): Performed by: EMERGENCY MEDICINE

## 2024-10-20 PROCEDURE — 83880 ASSAY OF NATRIURETIC PEPTIDE: CPT

## 2024-10-20 PROCEDURE — A9270 NON-COVERED ITEM OR SERVICE: HCPCS | Performed by: EMERGENCY MEDICINE

## 2024-10-20 PROCEDURE — 99284 EMERGENCY DEPT VISIT MOD MDM: CPT

## 2024-10-20 PROCEDURE — RXMED WILLOW AMBULATORY MEDICATION CHARGE: Performed by: EMERGENCY MEDICINE

## 2024-10-20 PROCEDURE — 36415 COLL VENOUS BLD VENIPUNCTURE: CPT

## 2024-10-20 PROCEDURE — 93005 ELECTROCARDIOGRAM TRACING: CPT | Performed by: EMERGENCY MEDICINE

## 2024-10-20 PROCEDURE — 85025 COMPLETE CBC W/AUTO DIFF WBC: CPT

## 2024-10-20 PROCEDURE — 84484 ASSAY OF TROPONIN QUANT: CPT | Mod: 91

## 2024-10-20 PROCEDURE — 93971 EXTREMITY STUDY: CPT | Mod: LT

## 2024-10-20 PROCEDURE — 80053 COMPREHEN METABOLIC PANEL: CPT

## 2024-10-20 RX ORDER — SULFAMETHOXAZOLE AND TRIMETHOPRIM 800; 160 MG/1; MG/1
1 TABLET ORAL ONCE
Status: COMPLETED | OUTPATIENT
Start: 2024-10-20 | End: 2024-10-20

## 2024-10-20 RX ORDER — CEPHALEXIN 500 MG/1
500 CAPSULE ORAL 4 TIMES DAILY
Qty: 20 CAPSULE | Refills: 0 | Status: SHIPPED | OUTPATIENT
Start: 2024-10-20 | End: 2024-10-20

## 2024-10-20 RX ORDER — CEPHALEXIN 500 MG/1
500 CAPSULE ORAL ONCE
Status: COMPLETED | OUTPATIENT
Start: 2024-10-20 | End: 2024-10-20

## 2024-10-20 RX ORDER — CEPHALEXIN 500 MG/1
500 CAPSULE ORAL 4 TIMES DAILY
Qty: 20 CAPSULE | Refills: 0 | Status: ACTIVE | OUTPATIENT
Start: 2024-10-20 | End: 2024-10-25

## 2024-10-20 RX ORDER — SULFAMETHOXAZOLE AND TRIMETHOPRIM 800; 160 MG/1; MG/1
1 TABLET ORAL EVERY 12 HOURS
Qty: 10 TABLET | Refills: 0 | Status: ACTIVE | OUTPATIENT
Start: 2024-10-20 | End: 2024-10-25

## 2024-10-20 RX ORDER — SULFAMETHOXAZOLE AND TRIMETHOPRIM 800; 160 MG/1; MG/1
1 TABLET ORAL EVERY 12 HOURS
Qty: 10 TABLET | Refills: 0 | Status: SHIPPED | OUTPATIENT
Start: 2024-10-20 | End: 2024-10-20

## 2024-10-20 RX ADMIN — CEPHALEXIN 500 MG: 500 CAPSULE ORAL at 13:24

## 2024-10-20 RX ADMIN — SULFAMETHOXAZOLE AND TRIMETHOPRIM 1 TABLET: 800; 160 TABLET ORAL at 13:24

## 2024-10-20 ASSESSMENT — FIBROSIS 4 INDEX: FIB4 SCORE: 2.66

## 2024-10-25 ENCOUNTER — HOSPITAL ENCOUNTER (EMERGENCY)
Facility: MEDICAL CENTER | Age: 64
End: 2024-10-25
Attending: EMERGENCY MEDICINE
Payer: MEDICARE

## 2024-10-25 ENCOUNTER — APPOINTMENT (OUTPATIENT)
Dept: RADIOLOGY | Facility: MEDICAL CENTER | Age: 64
End: 2024-10-25
Attending: EMERGENCY MEDICINE
Payer: MEDICARE

## 2024-10-25 ENCOUNTER — PHARMACY VISIT (OUTPATIENT)
Dept: PHARMACY | Facility: MEDICAL CENTER | Age: 64
End: 2024-10-25
Payer: COMMERCIAL

## 2024-10-25 VITALS
HEART RATE: 83 BPM | WEIGHT: 147.49 LBS | DIASTOLIC BLOOD PRESSURE: 74 MMHG | RESPIRATION RATE: 13 BRPM | OXYGEN SATURATION: 95 % | BODY MASS INDEX: 20.57 KG/M2 | TEMPERATURE: 97.7 F | SYSTOLIC BLOOD PRESSURE: 122 MMHG

## 2024-10-25 DIAGNOSIS — L03.116 CELLULITIS OF LEFT LOWER EXTREMITY: ICD-10-CM

## 2024-10-25 LAB
ALBUMIN SERPL BCP-MCNC: 3.6 G/DL (ref 3.2–4.9)
ALBUMIN/GLOB SERPL: 1.2 G/DL
ALP SERPL-CCNC: 70 U/L (ref 30–99)
ALT SERPL-CCNC: 44 U/L (ref 2–50)
ANION GAP SERPL CALC-SCNC: 14 MMOL/L (ref 7–16)
AST SERPL-CCNC: 48 U/L (ref 12–45)
BASOPHILS # BLD AUTO: 0.3 % (ref 0–1.8)
BASOPHILS # BLD: 0.02 K/UL (ref 0–0.12)
BILIRUB SERPL-MCNC: 0.4 MG/DL (ref 0.1–1.5)
BUN SERPL-MCNC: 19 MG/DL (ref 8–22)
CALCIUM ALBUM COR SERPL-MCNC: 9.2 MG/DL (ref 8.5–10.5)
CALCIUM SERPL-MCNC: 8.9 MG/DL (ref 8.5–10.5)
CHLORIDE SERPL-SCNC: 100 MMOL/L (ref 96–112)
CO2 SERPL-SCNC: 21 MMOL/L (ref 20–33)
CREAT SERPL-MCNC: 0.83 MG/DL (ref 0.5–1.4)
EOSINOPHIL # BLD AUTO: 0.04 K/UL (ref 0–0.51)
EOSINOPHIL NFR BLD: 0.6 % (ref 0–6.9)
ERYTHROCYTE [DISTWIDTH] IN BLOOD BY AUTOMATED COUNT: 49.9 FL (ref 35.9–50)
GFR SERPLBLD CREATININE-BSD FMLA CKD-EPI: 99 ML/MIN/1.73 M 2
GLOBULIN SER CALC-MCNC: 3 G/DL (ref 1.9–3.5)
GLUCOSE SERPL-MCNC: 87 MG/DL (ref 65–99)
HCT VFR BLD AUTO: 38.2 % (ref 42–52)
HGB BLD-MCNC: 12.8 G/DL (ref 14–18)
IMM GRANULOCYTES # BLD AUTO: 0.01 K/UL (ref 0–0.11)
IMM GRANULOCYTES NFR BLD AUTO: 0.2 % (ref 0–0.9)
LACTATE SERPL-SCNC: 1.3 MMOL/L (ref 0.5–2)
LYMPHOCYTES # BLD AUTO: 1.62 K/UL (ref 1–4.8)
LYMPHOCYTES NFR BLD: 25.3 % (ref 22–41)
MCH RBC QN AUTO: 32.1 PG (ref 27–33)
MCHC RBC AUTO-ENTMCNC: 33.5 G/DL (ref 32.3–36.5)
MCV RBC AUTO: 95.7 FL (ref 81.4–97.8)
MONOCYTES # BLD AUTO: 0.51 K/UL (ref 0–0.85)
MONOCYTES NFR BLD AUTO: 8 % (ref 0–13.4)
NEUTROPHILS # BLD AUTO: 4.2 K/UL (ref 1.82–7.42)
NEUTROPHILS NFR BLD: 65.6 % (ref 44–72)
NRBC # BLD AUTO: 0 K/UL
NRBC BLD-RTO: 0 /100 WBC (ref 0–0.2)
PLATELET # BLD AUTO: 239 K/UL (ref 164–446)
PMV BLD AUTO: 7.9 FL (ref 9–12.9)
POTASSIUM SERPL-SCNC: 3.9 MMOL/L (ref 3.6–5.5)
PROT SERPL-MCNC: 6.6 G/DL (ref 6–8.2)
RBC # BLD AUTO: 3.99 M/UL (ref 4.7–6.1)
SODIUM SERPL-SCNC: 135 MMOL/L (ref 135–145)
WBC # BLD AUTO: 6.4 K/UL (ref 4.8–10.8)

## 2024-10-25 PROCEDURE — 96374 THER/PROPH/DIAG INJ IV PUSH: CPT

## 2024-10-25 PROCEDURE — 73620 X-RAY EXAM OF FOOT: CPT | Mod: LT

## 2024-10-25 PROCEDURE — 99284 EMERGENCY DEPT VISIT MOD MDM: CPT

## 2024-10-25 PROCEDURE — 87040 BLOOD CULTURE FOR BACTERIA: CPT

## 2024-10-25 PROCEDURE — 36415 COLL VENOUS BLD VENIPUNCTURE: CPT

## 2024-10-25 PROCEDURE — 93971 EXTREMITY STUDY: CPT | Mod: LT

## 2024-10-25 PROCEDURE — RXMED WILLOW AMBULATORY MEDICATION CHARGE: Performed by: EMERGENCY MEDICINE

## 2024-10-25 PROCEDURE — 700105 HCHG RX REV CODE 258: Performed by: EMERGENCY MEDICINE

## 2024-10-25 PROCEDURE — 85025 COMPLETE CBC W/AUTO DIFF WBC: CPT

## 2024-10-25 PROCEDURE — 700102 HCHG RX REV CODE 250 W/ 637 OVERRIDE(OP): Performed by: EMERGENCY MEDICINE

## 2024-10-25 PROCEDURE — 80053 COMPREHEN METABOLIC PANEL: CPT

## 2024-10-25 PROCEDURE — 700111 HCHG RX REV CODE 636 W/ 250 OVERRIDE (IP): Performed by: EMERGENCY MEDICINE

## 2024-10-25 PROCEDURE — 83605 ASSAY OF LACTIC ACID: CPT

## 2024-10-25 PROCEDURE — A9270 NON-COVERED ITEM OR SERVICE: HCPCS | Performed by: EMERGENCY MEDICINE

## 2024-10-25 RX ORDER — CEPHALEXIN 500 MG/1
500 CAPSULE ORAL 4 TIMES DAILY
Qty: 28 CAPSULE | Refills: 0 | Status: ACTIVE | OUTPATIENT
Start: 2024-10-25 | End: 2024-11-01

## 2024-10-25 RX ORDER — SULFAMETHOXAZOLE AND TRIMETHOPRIM 800; 160 MG/1; MG/1
1 TABLET ORAL 2 TIMES DAILY
Qty: 14 TABLET | Refills: 0 | Status: ACTIVE | OUTPATIENT
Start: 2024-10-25 | End: 2024-11-01

## 2024-10-25 RX ORDER — SULFAMETHOXAZOLE AND TRIMETHOPRIM 800; 160 MG/1; MG/1
1 TABLET ORAL ONCE
Status: COMPLETED | OUTPATIENT
Start: 2024-10-25 | End: 2024-10-25

## 2024-10-25 RX ADMIN — SODIUM CHLORIDE 2 G: 9 INJECTION, SOLUTION INTRAVENOUS at 18:20

## 2024-10-25 RX ADMIN — SULFAMETHOXAZOLE AND TRIMETHOPRIM 1 TABLET: 800; 160 TABLET ORAL at 20:22

## 2024-10-25 ASSESSMENT — PAIN DESCRIPTION - PAIN TYPE: TYPE: ACUTE PAIN;CHRONIC PAIN

## 2024-10-25 ASSESSMENT — FIBROSIS 4 INDEX: FIB4 SCORE: 1.67

## 2024-10-30 LAB
BACTERIA BLD CULT: NORMAL
BACTERIA BLD CULT: NORMAL
SIGNIFICANT IND 70042: NORMAL
SIGNIFICANT IND 70042: NORMAL
SITE SITE: NORMAL
SITE SITE: NORMAL
SOURCE SOURCE: NORMAL
SOURCE SOURCE: NORMAL